# Patient Record
Sex: FEMALE | Race: WHITE | HISPANIC OR LATINO | Employment: FULL TIME | URBAN - METROPOLITAN AREA
[De-identification: names, ages, dates, MRNs, and addresses within clinical notes are randomized per-mention and may not be internally consistent; named-entity substitution may affect disease eponyms.]

---

## 2018-03-29 ENCOUNTER — OFFICE VISIT (OUTPATIENT)
Dept: MEDICAL GROUP | Facility: MEDICAL CENTER | Age: 38
End: 2018-03-29
Payer: COMMERCIAL

## 2018-03-29 VITALS
TEMPERATURE: 97.8 F | OXYGEN SATURATION: 98 % | BODY MASS INDEX: 29.94 KG/M2 | DIASTOLIC BLOOD PRESSURE: 72 MMHG | SYSTOLIC BLOOD PRESSURE: 118 MMHG | HEIGHT: 61 IN | WEIGHT: 158.6 LBS | HEART RATE: 66 BPM | RESPIRATION RATE: 16 BRPM

## 2018-03-29 DIAGNOSIS — K21.9 GASTROESOPHAGEAL REFLUX DISEASE WITHOUT ESOPHAGITIS: ICD-10-CM

## 2018-03-29 DIAGNOSIS — E55.9 VITAMIN D DEFICIENCY: ICD-10-CM

## 2018-03-29 DIAGNOSIS — Z83.3 FAMILY HISTORY OF DIABETES MELLITUS (DM): ICD-10-CM

## 2018-03-29 DIAGNOSIS — Z00.00 ANNUAL PHYSICAL EXAM: ICD-10-CM

## 2018-03-29 PROBLEM — Z91.09 ENVIRONMENTAL ALLERGIES: Status: ACTIVE | Noted: 2018-03-29

## 2018-03-29 PROBLEM — R10.13 EPIGASTRIC PAIN: Status: ACTIVE | Noted: 2018-03-29

## 2018-03-29 PROCEDURE — 99203 OFFICE O/P NEW LOW 30 MIN: CPT | Performed by: NURSE PRACTITIONER

## 2018-03-29 RX ORDER — RANITIDINE 150 MG/1
150 TABLET ORAL 2 TIMES DAILY PRN
Qty: 60 TAB | Refills: 11 | Status: SHIPPED | OUTPATIENT
Start: 2018-03-29 | End: 2019-03-06

## 2018-03-29 ASSESSMENT — PATIENT HEALTH QUESTIONNAIRE - PHQ9: CLINICAL INTERPRETATION OF PHQ2 SCORE: 0

## 2018-03-29 NOTE — PROGRESS NOTES
"Dorothy Diaz is a 37 y.o. female here to establish care and for epigastric pain ×1 year.    HPI:    Epigastric pain  Ongoing for the past year. Pain described as sharp and constant, lasts most of the day. Occurs 3-4 times per month. No associated nausea/vomiting, diarrhea, constipation. Does have acid indigestion with certain foods. Feels that the pain is worse when she is having indigestion. Worse with caffeine, greasy/fatty food, spicy food. Goes away on its own. Not worse at night. Has not taken any medication for this. She denies chest pain, shortness of breath, dizziness.    Current medicines (including changes today)  Current Outpatient Prescriptions   Medication Sig Dispense Refill   • raNITidine (ZANTAC) 150 MG Tab Take 1 Tab by mouth 2 times a day as needed for Heartburn. 60 Tab 11     No current facility-administered medications for this visit.      She  has a past medical history of Allergy.  She  has no past surgical history on file.  Social History   Substance Use Topics   • Smoking status: Never Smoker   • Smokeless tobacco: Never Used   • Alcohol use No     Social History     Social History Narrative   • No narrative on file     Family History   Problem Relation Age of Onset   • Diabetes Mother    • Diabetes Father      Family Status   Relation Status   • Mother    • Father          ROS  No chest pain, no abdominal pain, no rash.  Positive ROS as per HPI.  All other systems reviewed and are negative      Objective:     Blood pressure 118/72, pulse 66, temperature 36.6 °C (97.8 °F), resp. rate 16, height 1.549 m (5' 1\"), weight 71.9 kg (158 lb 9.6 oz), SpO2 98 %. Body mass index is 29.97 kg/m².     Physical Exam:    Constitutional: Alert, no distress.  Skin: Warm, dry, good turgor, no rashes in visible areas.  Eye: Equal, round and reactive, conjunctiva clear, lids normal.  ENMT: Lips without lesions, good dentition, oropharynx clear.  Neck: Trachea midline, no masses, no thyromegaly. No cervical or " supraclavicular lymphadenopathy.  Respiratory: Unlabored respiratory effort, lungs clear to auscultation, no wheezes, no ronchi.  Cardiovascular: Normal S1, S2, no murmur, no edema.  Abdomen: Soft, non-tender, no masses, no hepatosplenomegaly.  Psych: Alert and oriented x3, normal affect and mood.        Assessment and Plan:   The following treatment plan was discussed    1. Annual physical exam  Check labs, follow-up for annual.  - CBC WITH DIFFERENTIAL; Future  - COMP METABOLIC PANEL; Future  - LIPID PROFILE; Future  - TSH WITH REFLEX TO FT4; Future  - VITAMIN D,25 HYDROXY; Future    2. Gastroesophageal reflux disease without esophagitis  Unstable.  Advised patient to reduce fatty/greasy, spicy, acidic foods that increase symptoms.  Purchase Tums over-the-counter and use as needed for symptoms to see if this helps resolve.  If Tums are not effective, use ranitidine 150 mg as needed for heartburn symptoms.  - raNITidine (ZANTAC) 150 MG Tab; Take 1 Tab by mouth 2 times a day as needed for Heartburn.  Dispense: 60 Tab; Refill: 11    3. Vitamin D deficiency  Patient states she was previously very low.  Not currently taking a vitamin D supplement.  Recheck vitamin D level.  - VITAMIN D,25 HYDROXY; Future    4. Family history of diabetes mellitus (DM)  Mother and father are both diabetic.  Check A1c.  - HEMOGLOBIN A1C; Future      Followup: Return in about 4 weeks (around 4/26/2018) for Annual, GERD.    I have placed the below orders and discussed them with an approved delegating provider. The MA is performing the below orders under the direction of Dr. English

## 2018-03-29 NOTE — ASSESSMENT & PLAN NOTE
Ongoing for the past year. Pain described as sharp and constant, lasts most of the day. Occurs 3-4 times per month. No associated nausea/vomiting, diarrhea, constipation. Does have acid indigestion with certain foods. Feels that the pain is worse when she is having indigestion. Worse with caffeine, greasy/fatty food, spicy food. Goes away on its own. Not worse at night. Has not taken any medication for this. She denies chest pain, shortness of breath, dizziness.

## 2018-04-14 LAB
25(OH)D3+25(OH)D2 SERPL-MCNC: 17.8 NG/ML (ref 30–100)
ALBUMIN SERPL-MCNC: 4.3 G/DL (ref 3.5–5.5)
ALBUMIN/GLOB SERPL: 1.7 {RATIO} (ref 1.2–2.2)
ALP SERPL-CCNC: 72 IU/L (ref 39–117)
ALT SERPL-CCNC: 67 IU/L (ref 0–32)
AST SERPL-CCNC: 39 IU/L (ref 0–40)
BASOPHILS # BLD AUTO: 0 X10E3/UL (ref 0–0.2)
BASOPHILS NFR BLD AUTO: 0 %
BILIRUB SERPL-MCNC: 0.5 MG/DL (ref 0–1.2)
BUN SERPL-MCNC: 11 MG/DL (ref 6–20)
BUN/CREAT SERPL: 16 (ref 9–23)
CALCIUM SERPL-MCNC: 9.2 MG/DL (ref 8.7–10.2)
CHLORIDE SERPL-SCNC: 103 MMOL/L (ref 96–106)
CHOLEST SERPL-MCNC: 202 MG/DL (ref 100–199)
CO2 SERPL-SCNC: 21 MMOL/L (ref 18–29)
CREAT SERPL-MCNC: 0.68 MG/DL (ref 0.57–1)
EOSINOPHIL # BLD AUTO: 0.1 X10E3/UL (ref 0–0.4)
EOSINOPHIL NFR BLD AUTO: 2 %
ERYTHROCYTE [DISTWIDTH] IN BLOOD BY AUTOMATED COUNT: 13 % (ref 12.3–15.4)
GFR SERPLBLD CREATININE-BSD FMLA CKD-EPI: 113 ML/MIN/1.73
GFR SERPLBLD CREATININE-BSD FMLA CKD-EPI: 130 ML/MIN/1.73
GLOBULIN SER CALC-MCNC: 2.6 G/DL (ref 1.5–4.5)
GLUCOSE SERPL-MCNC: 117 MG/DL (ref 65–99)
HBA1C MFR BLD: 5.9 % (ref 4.8–5.6)
HCT VFR BLD AUTO: 40.1 % (ref 34–46.6)
HDLC SERPL-MCNC: 46 MG/DL
HGB BLD-MCNC: 13.6 G/DL (ref 11.1–15.9)
IMM GRANULOCYTES # BLD: 0 X10E3/UL (ref 0–0.1)
IMM GRANULOCYTES NFR BLD: 1 %
IMMATURE CELLS  115398: NORMAL
LABORATORY COMMENT REPORT: ABNORMAL
LDLC SERPL CALC-MCNC: 128 MG/DL (ref 0–99)
LYMPHOCYTES # BLD AUTO: 2.9 X10E3/UL (ref 0.7–3.1)
LYMPHOCYTES NFR BLD AUTO: 36 %
MCH RBC QN AUTO: 30.1 PG (ref 26.6–33)
MCHC RBC AUTO-ENTMCNC: 33.9 G/DL (ref 31.5–35.7)
MCV RBC AUTO: 89 FL (ref 79–97)
MONOCYTES # BLD AUTO: 0.7 X10E3/UL (ref 0.1–0.9)
MONOCYTES NFR BLD AUTO: 9 %
MORPHOLOGY BLD-IMP: NORMAL
NEUTROPHILS # BLD AUTO: 4.2 X10E3/UL (ref 1.4–7)
NEUTROPHILS NFR BLD AUTO: 52 %
NRBC BLD AUTO-RTO: NORMAL %
PLATELET # BLD AUTO: 325 X10E3/UL (ref 150–379)
POTASSIUM SERPL-SCNC: 4.1 MMOL/L (ref 3.5–5.2)
PROT SERPL-MCNC: 6.9 G/DL (ref 6–8.5)
RBC # BLD AUTO: 4.52 X10E6/UL (ref 3.77–5.28)
SODIUM SERPL-SCNC: 139 MMOL/L (ref 134–144)
TRIGL SERPL-MCNC: 139 MG/DL (ref 0–149)
TSH SERPL DL<=0.005 MIU/L-ACNC: 2.86 UIU/ML (ref 0.45–4.5)
VLDLC SERPL CALC-MCNC: 28 MG/DL (ref 5–40)
WBC # BLD AUTO: 8.1 X10E3/UL (ref 3.4–10.8)

## 2018-04-24 ENCOUNTER — OFFICE VISIT (OUTPATIENT)
Dept: URGENT CARE | Facility: PHYSICIAN GROUP | Age: 38
End: 2018-04-24
Payer: COMMERCIAL

## 2018-04-24 VITALS
OXYGEN SATURATION: 97 % | RESPIRATION RATE: 16 BRPM | BODY MASS INDEX: 30.96 KG/M2 | SYSTOLIC BLOOD PRESSURE: 118 MMHG | WEIGHT: 164 LBS | TEMPERATURE: 100.2 F | HEART RATE: 88 BPM | DIASTOLIC BLOOD PRESSURE: 70 MMHG | HEIGHT: 61 IN

## 2018-04-24 DIAGNOSIS — R11.2 NON-INTRACTABLE VOMITING WITH NAUSEA, UNSPECIFIED VOMITING TYPE: ICD-10-CM

## 2018-04-24 DIAGNOSIS — R19.7 DIARRHEA, UNSPECIFIED TYPE: ICD-10-CM

## 2018-04-24 DIAGNOSIS — R50.9 FEVER AND CHILLS: ICD-10-CM

## 2018-04-24 LAB
FLUAV+FLUBV AG SPEC QL IA: NEGATIVE
INT CON NEG: NEGATIVE
INT CON POS: POSITIVE

## 2018-04-24 PROCEDURE — 99203 OFFICE O/P NEW LOW 30 MIN: CPT | Performed by: FAMILY MEDICINE

## 2018-04-24 PROCEDURE — 87804 INFLUENZA ASSAY W/OPTIC: CPT | Performed by: FAMILY MEDICINE

## 2018-04-24 RX ORDER — ONDANSETRON 4 MG/1
4 TABLET, ORALLY DISINTEGRATING ORAL EVERY 8 HOURS PRN
Qty: 10 TAB | Refills: 0 | Status: SHIPPED | OUTPATIENT
Start: 2018-04-24 | End: 2018-04-26

## 2018-04-24 NOTE — LETTER
April 24, 2018         Patient: Dorothy Diaz   YOB: 1980   Date of Visit: 4/24/2018           To Whom it May Concern:    Dorothy Diaz was seen in my clinic on 4/24/2018. Please excuse 4/24 through 4/26/2018.       Sincerely,           Gordo Capellan M.D.  Electronically Signed

## 2018-04-26 ENCOUNTER — OFFICE VISIT (OUTPATIENT)
Dept: MEDICAL GROUP | Facility: MEDICAL CENTER | Age: 38
End: 2018-04-26
Payer: COMMERCIAL

## 2018-04-26 VITALS
HEART RATE: 73 BPM | OXYGEN SATURATION: 97 % | DIASTOLIC BLOOD PRESSURE: 60 MMHG | HEIGHT: 61 IN | WEIGHT: 164 LBS | TEMPERATURE: 97.5 F | RESPIRATION RATE: 16 BRPM | BODY MASS INDEX: 30.96 KG/M2 | SYSTOLIC BLOOD PRESSURE: 100 MMHG

## 2018-04-26 DIAGNOSIS — E66.9 OBESITY (BMI 30-39.9): ICD-10-CM

## 2018-04-26 DIAGNOSIS — R74.01 ELEVATED ALT MEASUREMENT: ICD-10-CM

## 2018-04-26 DIAGNOSIS — R73.03 PREDIABETES: ICD-10-CM

## 2018-04-26 DIAGNOSIS — K21.9 GASTROESOPHAGEAL REFLUX DISEASE WITHOUT ESOPHAGITIS: ICD-10-CM

## 2018-04-26 DIAGNOSIS — Z00.00 ANNUAL PHYSICAL EXAM: ICD-10-CM

## 2018-04-26 PROCEDURE — 99395 PREV VISIT EST AGE 18-39: CPT | Performed by: NURSE PRACTITIONER

## 2018-04-26 NOTE — ASSESSMENT & PLAN NOTE
Patient states all her her brother and parents have been diagnosed with fatty livers. She denies RUQ pain or other abdominal pain. She does eat moderate amounts of sugar, carbohydrates, and red meat. She also eats moderate amounts of cheese and dairy products.

## 2018-04-26 NOTE — ASSESSMENT & PLAN NOTE
Social/Family: Single, no children.   Work: Full time, supervisor.   Diet: moderate carbs/sugar. Vegetables, small portions, trying to increase. Chicken and beef.   Caffeine/Energy Drinks/Soda: Coffee 2 cups per day. No energy drinks. Soda once per week.   Exercise: Walking around neighborhood.   Stress: Normal life stress.   Sleep: No issues.   Depression/Anxiety Concerns: No concerns.

## 2018-04-26 NOTE — PROGRESS NOTES
"Subjective:   Dorothy Diaz is a 37 y.o. female here today for follow-up on labs, GERD, and in no physical:    Gastroesophageal reflux disease without esophagitis  Improved with dietary changes and exercise. Not taking ranitidine.    Annual physical exam  Social/Family: Single, no children.   Work: Full time, supervisor.   Diet: moderate carbs/sugar. Vegetables, small portions, trying to increase. Chicken and beef.   Caffeine/Energy Drinks/Soda: Coffee 2 cups per day. No energy drinks. Soda once per week.   Exercise: Walking around neighborhood.   Stress: Normal life stress.   Sleep: No issues.   Depression/Anxiety Concerns: No concerns.       Elevated ALT measurement  Patient states all her her brother and parents have been diagnosed with fatty livers. She denies RUQ pain or other abdominal pain. She does eat moderate amounts of sugar, carbohydrates, and red meat. She also eats moderate amounts of cheese and dairy products.         Current medicines (including changes today)  Current Outpatient Prescriptions   Medication Sig Dispense Refill   • raNITidine (ZANTAC) 150 MG Tab Take 1 Tab by mouth 2 times a day as needed for Heartburn. 60 Tab 11     No current facility-administered medications for this visit.      She  has a past medical history of Allergy.    ROS   No chest pain, no shortness of breath, no abdominal pain  Positive ROS as per HPI.  All other systems reviewed and are negative.     Objective:     Blood pressure 100/60, pulse 73, temperature 36.4 °C (97.5 °F), resp. rate 16, height 1.549 m (5' 0.98\"), weight 74.4 kg (164 lb), SpO2 97 %. Body mass index is 31 kg/m².     Physical Exam:  Constitutional: Alert, no distress.  Skin: Warm, dry, good turgor, no rashes in visible areas.  Eye: Equal, round and reactive, conjunctiva clear, lids normal.  ENMT: Lips without lesions, good dentition, oropharynx clear.  Neck: Trachea midline, no masses, no thyromegaly. No cervical or supraclavicular " lymphadenopathy  Respiratory: Unlabored respiratory effort, lungs clear to auscultation, no wheezes, no ronchi.  Cardiovascular: Normal S1, S2, no murmur, no edema.  Abdomen: Soft, non-tender, no masses, no hepatosplenomegaly.  Psych: Alert and oriented x3, normal affect and mood.      Assessment and Plan:   The following treatment plan was discussed    1. Annual physical exam  Patient and I discussed the importance of lifestyle changes, with particular emphasis on decreasing sugar and carbohydrate intake and increasing plant-based nutrition (for the purposes of weight loss, general health, and prevention of chronic illnesses), as well as regular cardiovascular exercise, proper sleep, and stress management. Patient verbalized understanding.      2. Prediabetes  Unstable.  Dietary and lifestyle changes discussed.  Repeat A1c in 3 months.  - HEMOGLOBIN A1C; Future    3. Elevated ALT measurement  Unstable.  Likely underlying fatty liver disease.  Recheck CMP in 3 months after dietary changes are implemented and weight loss is achieved.  If liver enzymes are still elevated, check liver ultrasound.  - COMP METABOLIC PANEL; Future    4. Gastroesophageal reflux disease without esophagitis  Stable.  Continue with GERD diet modifications.    5. Obesity (BMI 30-39.9)  Diet and exercise education provided.  - Patient identified as having weight management issue.  Appropriate orders and counseling given.      Followup: Return in about 3 months (around 7/26/2018) for prediabetes, liver enzymes, weight loss.    I have placed the below orders and discussed them with an approved delegating provider. The MA is performing the below orders under the direction of Dr. English

## 2018-04-30 ASSESSMENT — ENCOUNTER SYMPTOMS
EYE REDNESS: 0
WEIGHT LOSS: 0
EYE DISCHARGE: 0
HEADACHES: 1

## 2018-05-01 NOTE — PROGRESS NOTES
"Subjective:      Dorothy Diaz is a 37 y.o. female who presents with Emesis (Diarrhea, redness/rash on face,chills, fever x2days )            2 days multiple episodes N/V/D without blood in stool or emesis. +chills. No recent foreign travel, abx, camping. Intermittetn abd cramping. No focal abdominal pain. Face appears flushed. Normal urine output.  No other aggravating or alleviating factors.          Review of Systems   Constitutional: Positive for malaise/fatigue. Negative for weight loss.   Eyes: Negative for discharge and redness.   Cardiovascular: Negative for chest pain.   Musculoskeletal: Negative for joint pain.   Neurological: Positive for headaches.          Objective:     /70   Pulse 88   Temp 37.9 °C (100.2 °F)   Resp 16   Ht 1.549 m (5' 1\")   Wt 74.4 kg (164 lb)   LMP 03/24/2018   SpO2 97%   Breastfeeding? No   BMI 30.99 kg/m²      Physical Exam   Constitutional: She appears well-developed and well-nourished. No distress.   HENT:   Head: Normocephalic and atraumatic.   Eyes: Conjunctivae are normal.   Neck: Neck supple. No JVD present.   Cardiovascular: Normal rate, regular rhythm and normal heart sounds.    Pulmonary/Chest: Effort normal and breath sounds normal. She has no wheezes.   Abdominal: Soft. There is no tenderness.   Hyperactive bowel sounds.'     Neurological:   Speech is clear. Patient is appropriate and cooperative.     Skin: Skin is warm and dry. No rash noted.               Assessment/Plan:   Influenza negative    1. Fever and chills  POCT Influenza A/B   2. Non-intractable vomiting with nausea, unspecified vomiting type  DISCONTINUED: ondansetron (ZOFRAN ODT) 4 MG TABLET DISPERSIBLE   3. Diarrhea, unspecified type         Differential diagnosis, natural history, supportive care, and indications for immediate follow-up discussed at length.     Suspect viral AGE    Push fluids/pedialyte    Imodium prn    "

## 2018-07-21 LAB
ALBUMIN SERPL-MCNC: 4.4 G/DL (ref 3.5–5.5)
ALBUMIN/GLOB SERPL: 1.9 {RATIO} (ref 1.2–2.2)
ALP SERPL-CCNC: 83 IU/L (ref 39–117)
ALT SERPL-CCNC: 32 IU/L (ref 0–32)
AST SERPL-CCNC: 22 IU/L (ref 0–40)
BILIRUB SERPL-MCNC: 0.3 MG/DL (ref 0–1.2)
BUN SERPL-MCNC: 15 MG/DL (ref 6–20)
BUN/CREAT SERPL: 22 (ref 9–23)
CALCIUM SERPL-MCNC: 8.9 MG/DL (ref 8.7–10.2)
CHLORIDE SERPL-SCNC: 105 MMOL/L (ref 96–106)
CO2 SERPL-SCNC: 20 MMOL/L (ref 20–29)
CREAT SERPL-MCNC: 0.67 MG/DL (ref 0.57–1)
GLOBULIN SER CALC-MCNC: 2.3 G/DL (ref 1.5–4.5)
GLUCOSE SERPL-MCNC: 115 MG/DL (ref 65–99)
HBA1C MFR BLD: 5.6 % (ref 4.8–5.6)
IF AFRICAN AMERICAN  100797: 130 ML/MIN/1.73
IF NON AFRICAN AMER 100791: 113 ML/MIN/1.73
POTASSIUM SERPL-SCNC: 4.3 MMOL/L (ref 3.5–5.2)
PROT SERPL-MCNC: 6.7 G/DL (ref 6–8.5)
SODIUM SERPL-SCNC: 141 MMOL/L (ref 134–144)

## 2018-07-24 ENCOUNTER — TELEPHONE (OUTPATIENT)
Dept: MEDICAL GROUP | Facility: MEDICAL CENTER | Age: 38
End: 2018-07-24

## 2018-07-24 NOTE — TELEPHONE ENCOUNTER
----- Message from LYNDSAY Kolb sent at 7/23/2018  7:09 PM PDT -----  Will discuss lab results at future appointment.   LYNDSAY Kolb

## 2018-07-26 ENCOUNTER — OFFICE VISIT (OUTPATIENT)
Dept: MEDICAL GROUP | Facility: MEDICAL CENTER | Age: 38
End: 2018-07-26
Payer: COMMERCIAL

## 2018-07-26 VITALS
DIASTOLIC BLOOD PRESSURE: 62 MMHG | BODY MASS INDEX: 31.15 KG/M2 | HEART RATE: 54 BPM | WEIGHT: 165 LBS | SYSTOLIC BLOOD PRESSURE: 100 MMHG | RESPIRATION RATE: 16 BRPM | OXYGEN SATURATION: 100 % | HEIGHT: 61 IN | TEMPERATURE: 98.1 F

## 2018-07-26 DIAGNOSIS — Z30.011 ENCOUNTER FOR INITIAL PRESCRIPTION OF CONTRACEPTIVE PILLS: ICD-10-CM

## 2018-07-26 DIAGNOSIS — R74.01 ELEVATED ALT MEASUREMENT: ICD-10-CM

## 2018-07-26 DIAGNOSIS — R73.03 PREDIABETES: ICD-10-CM

## 2018-07-26 PROCEDURE — 99214 OFFICE O/P EST MOD 30 MIN: CPT | Performed by: NURSE PRACTITIONER

## 2018-07-26 RX ORDER — LEVONORGESTREL AND ETHINYL ESTRADIOL 0.1-0.02MG
1 KIT ORAL DAILY
Qty: 28 TAB | Refills: 11 | Status: SHIPPED | OUTPATIENT
Start: 2018-07-26 | End: 2019-03-06

## 2018-07-26 NOTE — PROGRESS NOTES
"Subjective:   Dorothy Diaz is a 38 y.o. female here today for follow-up on labs:    Prediabetes  A1C improved from 5.9 to 5.6. Has made significant dietary changes.     Elevated ALT measurement  Normalized since 3 months ago.          Current medicines (including changes today)  Current Outpatient Prescriptions   Medication Sig Dispense Refill   • levonorgestrel-ethinyl estradiol (AVIANE, ALESSE, LESSINA) 0.1-20 MG-MCG per tablet Take 1 Tab by mouth every day. 28 Tab 11   • raNITidine (ZANTAC) 150 MG Tab Take 1 Tab by mouth 2 times a day as needed for Heartburn. 60 Tab 11     No current facility-administered medications for this visit.      She  has a past medical history of Allergy.    ROS   No chest pain, no shortness of breath, no abdominal pain  Positive ROS as per HPI.  All other systems reviewed and are negative.     Objective:     Blood pressure 100/62, pulse (!) 54, temperature 36.7 °C (98.1 °F), resp. rate 16, height 1.549 m (5' 0.98\"), weight 74.8 kg (165 lb), SpO2 100 %. Body mass index is 31.19 kg/m².     Physical Exam:  Constitutional: Alert, no distress.  Skin: Warm, dry, good turgor, no rashes in visible areas.  Eye: Equal, round, conjunctiva clear, lids normal.  ENMT: Lips without lesions, good dentition  Neck: Trachea midline  Respiratory: Unlabored respiratory effort  Cardiovascular: No edema.  Psych: Alert and oriented x3, normal affect and mood.      Assessment and Plan:   The following treatment plan was discussed    1. Prediabetes  Stable, resolved.  Continue with dietary changes discussed, repeat A1c annually.    2. Elevated ALT measurement  Stable, resolved.  Continue with dietary changes as discussed.  Repeat  Liver function annually.    3. Encounter for initial prescription of contraceptive pills  Begin OCP thereafter menses ends.  Patient to notify me of any side effects from medication.  We discussed that birth control can increase risk of blood clots, liver tumors, gallbladder disease, " and stroke. Side effects can include headache, constipation, nausea, mood swings. They do not protect against STDs, therefore condoms should be worn with new partners. They are not effective if not taken as prescribed, if a dose is missed or delayed a backup method should be used. Antibiotics can make birth control less effective, if an antibiotic is taken a backup method should be used. Any unusual headache, leg pain/swelling, chest pain, shortness of breath, difficulty speaking or moving should be addressed immediately in the ER. Patient verbalizes understanding.     - levonorgestrel-ethinyl estradiol (AVIANE, ALESSE, LESSINA) 0.1-20 MG-MCG per tablet; Take 1 Tab by mouth every day.  Dispense: 28 Tab; Refill: 11      Followup: Return in about 1 year (around 7/26/2019), or if symptoms worsen or fail to improve.    I have placed the below orders and discussed them with an approved delegating provider. The MA is performing the below orders under the direction of Dr. English

## 2019-02-17 ENCOUNTER — OFFICE VISIT (OUTPATIENT)
Dept: URGENT CARE | Facility: PHYSICIAN GROUP | Age: 39
End: 2019-02-17
Payer: COMMERCIAL

## 2019-02-17 VITALS
WEIGHT: 145 LBS | BODY MASS INDEX: 27.38 KG/M2 | HEIGHT: 61 IN | RESPIRATION RATE: 16 BRPM | HEART RATE: 86 BPM | TEMPERATURE: 98 F | OXYGEN SATURATION: 94 % | DIASTOLIC BLOOD PRESSURE: 64 MMHG | SYSTOLIC BLOOD PRESSURE: 116 MMHG

## 2019-02-17 DIAGNOSIS — J98.8 RTI (RESPIRATORY TRACT INFECTION): ICD-10-CM

## 2019-02-17 PROCEDURE — 99214 OFFICE O/P EST MOD 30 MIN: CPT | Performed by: PHYSICIAN ASSISTANT

## 2019-02-17 RX ORDER — FLUTICASONE PROPIONATE 50 MCG
1 SPRAY, SUSPENSION (ML) NASAL DAILY
Qty: 16 G | Refills: 0 | Status: SHIPPED | OUTPATIENT
Start: 2019-02-17 | End: 2019-03-06

## 2019-02-17 RX ORDER — DOXYCYCLINE HYCLATE 100 MG
100 TABLET ORAL 2 TIMES DAILY
Qty: 20 TAB | Refills: 0 | Status: SHIPPED | OUTPATIENT
Start: 2019-02-17 | End: 2019-02-27

## 2019-02-17 ASSESSMENT — COPD QUESTIONNAIRES: COPD: 0

## 2019-02-17 ASSESSMENT — ENCOUNTER SYMPTOMS
SHORTNESS OF BREATH: 0
MYALGIAS: 1
COUGH: 1
FEVER: 1

## 2019-02-17 NOTE — PROGRESS NOTES
"Subjective:   Dorothy Diaz is a 38 y.o. female who presents for Cough (x 5 days )        Cough   This is a new problem. The current episode started in the past 7 days. The problem has been gradually worsening. The cough is productive of sputum. Associated symptoms include a fever (subjective), myalgias, nasal congestion and postnasal drip. Pertinent negatives include no shortness of breath. Treatments tried: nyquil, dayquil, zyrtec. The treatment provided mild relief. Her past medical history is significant for environmental allergies. There is no history of asthma, bronchitis, COPD, emphysema or pneumonia.     Review of Systems   Constitutional: Positive for fever (subjective).   HENT: Positive for postnasal drip.    Respiratory: Positive for cough. Negative for shortness of breath.    Musculoskeletal: Positive for myalgias.   Endo/Heme/Allergies: Positive for environmental allergies.       PMH:  has a past medical history of Allergy.  MEDS:   Current Outpatient Prescriptions:   •  doxycycline (VIBRAMYCIN) 100 MG Tab, Take 1 Tab by mouth 2 times a day for 10 days., Disp: 20 Tab, Rfl: 0  •  fluticasone (FLONASE) 50 MCG/ACT nasal spray, Spray 1 Spray in nose every day., Disp: 16 g, Rfl: 0  •  levonorgestrel-ethinyl estradiol (AVIANE, ALESSE, LESSINA) 0.1-20 MG-MCG per tablet, Take 1 Tab by mouth every day., Disp: 28 Tab, Rfl: 11  •  raNITidine (ZANTAC) 150 MG Tab, Take 1 Tab by mouth 2 times a day as needed for Heartburn., Disp: 60 Tab, Rfl: 11  ALLERGIES: No Known Allergies  SURGHX: No past surgical history on file.  SOCHX:  reports that she has never smoked. She has never used smokeless tobacco. She reports that she does not drink alcohol or use drugs.  FH: Family history was reviewed, no pertinent findings to report   Objective:   /64   Pulse 86   Temp 36.7 °C (98 °F) (Temporal)   Resp 16   Ht 1.549 m (5' 1\")   Wt 65.8 kg (145 lb)   SpO2 94%   BMI 27.40 kg/m²   Physical Exam   Constitutional: She " appears well-developed and well-nourished.   HENT:   Head: Normocephalic and atraumatic.   Right Ear: Tympanic membrane, external ear and ear canal normal.   Left Ear: Tympanic membrane, external ear and ear canal normal.   Nose: Rhinorrhea present. Right sinus exhibits frontal sinus tenderness. Left sinus exhibits frontal sinus tenderness.   Mouth/Throat: Uvula is midline, oropharynx is clear and moist and mucous membranes are normal.   Neck: Neck supple.   Cardiovascular: Normal rate, regular rhythm and normal heart sounds.  Exam reveals no gallop and no friction rub.    No murmur heard.  Pulmonary/Chest: Effort normal. No respiratory distress. She has no decreased breath sounds. She has no wheezes. She has rhonchi (with cough). She has no rales.   Neurological: She is alert.   Skin: Skin is warm and dry.   Psychiatric: She has a normal mood and affect. Her behavior is normal. Thought content normal.   Vitals reviewed.        Assessment/Plan:   1. RTI (respiratory tract infection)  - doxycycline (VIBRAMYCIN) 100 MG Tab; Take 1 Tab by mouth 2 times a day for 10 days.  Dispense: 20 Tab; Refill: 0  - fluticasone (FLONASE) 50 MCG/ACT nasal spray; Spray 1 Spray in nose every day.  Dispense: 16 g; Refill: 0    Physical exam consistent with upper respiratory infection and developing bronchitis.  Oxygen saturation is within normal limits but slightly low for patient at 94%.  Clinical suspicion for pneumonia is low.  I will treat patient with doxycycline.  Pt instructed to complete full course of medication despite symptomatic improvement. Pt to take med with meals to alleviate GI upset. Pt to take a probiotic or eat Annel’s yogurt/ kefir while taking the medication.    Flonase 1 squirt in each nostril, as instructed in clinic, once a day.  Use nasal saline TID to promote drainage.   Salt water gurgles to soothe sore throat.  Ayr saline gel to moisturize nasal passage and prevent bleeding.  Try to use sudafed sparingly in  order to allow sinuses to drain.  Avoid the longer formulations and try to take only in the morning and/or at noon if needed.  If you fail to improve in 3-5 days or symptoms worsen/new symptoms develop, RTC for reevaluation    Patient given work note to excuse her from work today and tomorrow.  Patient should rest and increase fluid intake.    Differential diagnosis, natural history, supportive care, and indications for immediate follow-up discussed.

## 2019-02-17 NOTE — LETTER
[facility] [place]  [address]  [place] [id number]     February 17, 2019    Patient: Dorothy Diaz   YOB: 1980   Date of Visit: 2/17/2019       To Whom It May Concern:    Dorothy Diaz was seen and treated in our department on 2/17/2019. Please excuse her from work from 2/17-1/18.    Sincerely,     Piyush Velez

## 2019-03-06 ENCOUNTER — HOSPITAL ENCOUNTER (OUTPATIENT)
Dept: RADIOLOGY | Facility: MEDICAL CENTER | Age: 39
End: 2019-03-06
Attending: FAMILY MEDICINE
Payer: COMMERCIAL

## 2019-03-06 ENCOUNTER — OFFICE VISIT (OUTPATIENT)
Dept: MEDICAL GROUP | Age: 39
End: 2019-03-06
Payer: COMMERCIAL

## 2019-03-06 VITALS
BODY MASS INDEX: 28.13 KG/M2 | HEIGHT: 61 IN | WEIGHT: 149 LBS | DIASTOLIC BLOOD PRESSURE: 72 MMHG | OXYGEN SATURATION: 96 % | TEMPERATURE: 98.4 F | SYSTOLIC BLOOD PRESSURE: 124 MMHG | HEART RATE: 70 BPM

## 2019-03-06 DIAGNOSIS — J40 BRONCHITIS: ICD-10-CM

## 2019-03-06 PROCEDURE — 71046 X-RAY EXAM CHEST 2 VIEWS: CPT

## 2019-03-06 PROCEDURE — 99214 OFFICE O/P EST MOD 30 MIN: CPT | Performed by: FAMILY MEDICINE

## 2019-03-06 RX ORDER — ASCORBIC ACID 500 MG
500 TABLET ORAL 4 TIMES DAILY
Qty: 90 TAB | Refills: 0 | Status: SHIPPED | OUTPATIENT
Start: 2019-03-06 | End: 2020-03-30

## 2019-03-06 RX ORDER — PREDNISONE 20 MG/1
TABLET ORAL
Qty: 12 TAB | Refills: 0 | Status: SHIPPED | OUTPATIENT
Start: 2019-03-06 | End: 2020-03-30

## 2019-03-06 RX ORDER — DOXYCYCLINE HYCLATE 100 MG
100 TABLET ORAL 2 TIMES DAILY
Qty: 20 TAB | Refills: 0 | Status: SHIPPED | OUTPATIENT
Start: 2019-03-06 | End: 2020-03-30

## 2019-03-06 ASSESSMENT — PATIENT HEALTH QUESTIONNAIRE - PHQ9: CLINICAL INTERPRETATION OF PHQ2 SCORE: 0

## 2019-03-06 NOTE — ASSESSMENT & PLAN NOTE
The patient is a 38-year-old female who presents clinic with chief complaint of generalized malaise cough productive of greenish sputum, sore throat since roughly 3 weeks ago.  He was seen in urgent care on 17 February 2019 she was given doxycycline and Flonase her symptoms improved for the first 2 days but have returned since.  He is unable to sleep difficulty with appetite, no fevers no chills no nausea no vomiting she is able to tolerate p.o. fluids denies any headaches no new rashes no joint pain no changes in vision.

## 2019-03-06 NOTE — PROGRESS NOTES
This medical record contains text that has been entered with the assistance of computer voice recognition and dictation software.  Therefore, it may contain unintended errors in text, spelling, punctuation, or grammar    Chief Complaint   Patient presents with   • Cough     x3 weeks   • Fever     x3 weeks   • Headache     [id number]   • Blurred Vision     [id number]         Dorothy Diaz is a 38 y.o. female here evaluation and management of: cough      HPI:     Bronchitis  The patient is a 38-year-old female who presents clinic with chief complaint of generalized malaise cough productive of greenish sputum, sore throat since roughly 3 weeks ago.  He was seen in urgent care on 17 February 2019 she was given doxycycline and Flonase her symptoms improved for the first 2 days but have returned since.  He is unable to sleep difficulty with appetite, no fevers no chills no nausea no vomiting she is able to tolerate p.o. fluids denies any headaches no new rashes no joint pain no changes in vision.    Current medicines (including changes today)  Current Outpatient Prescriptions   Medication Sig Dispense Refill   • doxycycline (VIBRAMYCIN) 100 MG Tab Take 1 Tab by mouth 2 times a day. 20 Tab 0   • predniSONE (DELTASONE) 20 MG Tab Take 3 tabs po for 2 days then 2 tabs for 2 days then 1 for 2 days 12 Tab 0   • ascorbic acid (VITAMIN C) 500 MG tablet Take 1 Tab by mouth 4 times a day. 90 Tab 0     No current facility-administered medications for this visit.      She  has a past medical history of Allergy.  She  has no past surgical history on file.  Social History   Substance Use Topics   • Smoking status: Never Smoker   • Smokeless tobacco: Never Used   • Alcohol use No     Social History     Social History Narrative   • No narrative on file     Family History   Problem Relation Age of Onset   • Diabetes Mother    • Diabetes Father      Family Status   Relation Status   • Mo (Not Specified)   • Fa (Not Specified)  "        ROS    Please see hpi     All other systems reviewed and are negative     Objective:     Blood pressure 124/72, pulse 70, temperature 36.9 °C (98.4 °F), height 1.549 m (5' 1\"), weight 67.6 kg (149 lb), last menstrual period 02/12/2019, SpO2 96 %, not currently breastfeeding. Body mass index is 28.15 kg/m².  Physical Exam:    Constitutional: Alert, no distress.  Skin: Warm, dry, good turgor, no rashes in visible areas  Eye: Equal, round and reactive, conjunctiva clear, lids normal.  ENMT: Lips without lesions, good dentition, oropharynx clear.  Neck: Trachea midline, no masses, no thyromegaly. No cervical or supraclavicular lymphadenopathy.  Respiratory: Unlabored respiratory effort, lungs clear to auscultation, no wheezes, no ronchi.  Cardiovascular: Normal S1, S2, no murmur, no edema  Abdomen: Soft, non-tender, no masses, no hepatosplenomegaly.  Psych: Alert and oriented x3, normal affect and mood.          Assessment and Plan:   The following treatment plan was discussed      1. Bronchitis    I do not want to change the antibiotic regimen in effort to prevent resistance  We will repeat a course of doxycycline in addition to oral steroids      - doxycycline (VIBRAMYCIN) 100 MG Tab; Take 1 Tab by mouth 2 times a day.  Dispense: 20 Tab; Refill: 0  - predniSONE (DELTASONE) 20 MG Tab; Take 3 tabs po for 2 days then 2 tabs for 2 days then 1 for 2 days  Dispense: 12 Tab; Refill: 0  - DX-CHEST-2 VIEWS; Future  - ascorbic acid (VITAMIN C) 500 MG tablet; Take 1 Tab by mouth 4 times a day.  Dispense: 90 Tab; Refill: 0            Instructed to Follow up in clinic or ER for worsening symptoms, difficulty breathing, lack of expected recovery, or should new symptoms or problems arise.    Followup: Return in about 3 months (around 6/6/2019) for With PCP.       Once again this medical record contains text that has been entered with the assistance of computer voice recognition and dictation software.  Therefore, it may " contain unintended errors in text, spelling, punctuation, or grammar

## 2019-03-06 NOTE — LETTER
March 6, 2019         Patient: Dorothy Diaz   YOB: 1980   Date of Visit: 3/6/2019           To Whom it May Concern:    Dorothy Diaz was seen in my clinic on 3/6/2019. She may return to work on 3/11/2019    If you have any questions or concerns, please don't hesitate to call.        Sincerely,           Luis Mtz M.D.  Electronically Signed

## 2020-03-30 ENCOUNTER — OFFICE VISIT (OUTPATIENT)
Dept: MEDICAL GROUP | Facility: MEDICAL CENTER | Age: 40
End: 2020-03-30
Payer: COMMERCIAL

## 2020-03-30 VITALS
OXYGEN SATURATION: 97 % | WEIGHT: 163 LBS | SYSTOLIC BLOOD PRESSURE: 118 MMHG | TEMPERATURE: 97.5 F | HEIGHT: 61 IN | BODY MASS INDEX: 30.78 KG/M2 | DIASTOLIC BLOOD PRESSURE: 70 MMHG | HEART RATE: 66 BPM

## 2020-03-30 DIAGNOSIS — E66.9 OBESITY (BMI 30-39.9): ICD-10-CM

## 2020-03-30 DIAGNOSIS — R73.03 PREDIABETES: ICD-10-CM

## 2020-03-30 DIAGNOSIS — E55.9 VITAMIN D DEFICIENCY: ICD-10-CM

## 2020-03-30 DIAGNOSIS — E78.00 PURE HYPERCHOLESTEROLEMIA: ICD-10-CM

## 2020-03-30 DIAGNOSIS — Z23 NEED FOR VACCINATION: ICD-10-CM

## 2020-03-30 DIAGNOSIS — Z00.00 ANNUAL PHYSICAL EXAM: ICD-10-CM

## 2020-03-30 DIAGNOSIS — K21.9 GASTROESOPHAGEAL REFLUX DISEASE WITHOUT ESOPHAGITIS: ICD-10-CM

## 2020-03-30 PROCEDURE — 90471 IMMUNIZATION ADMIN: CPT | Performed by: NURSE PRACTITIONER

## 2020-03-30 PROCEDURE — 90686 IIV4 VACC NO PRSV 0.5 ML IM: CPT | Performed by: NURSE PRACTITIONER

## 2020-03-30 PROCEDURE — 90472 IMMUNIZATION ADMIN EACH ADD: CPT | Performed by: NURSE PRACTITIONER

## 2020-03-30 PROCEDURE — 99395 PREV VISIT EST AGE 18-39: CPT | Mod: 25 | Performed by: NURSE PRACTITIONER

## 2020-03-30 PROCEDURE — 90715 TDAP VACCINE 7 YRS/> IM: CPT | Performed by: NURSE PRACTITIONER

## 2020-03-30 RX ORDER — OMEPRAZOLE 20 MG/1
20 CAPSULE, DELAYED RELEASE ORAL DAILY
Qty: 30 CAP | Refills: 6 | Status: SHIPPED | OUTPATIENT
Start: 2020-03-30 | End: 2020-05-11

## 2020-03-30 ASSESSMENT — PATIENT HEALTH QUESTIONNAIRE - PHQ9: CLINICAL INTERPRETATION OF PHQ2 SCORE: 0

## 2020-03-30 ASSESSMENT — FIBROSIS 4 INDEX: FIB4 SCORE: 0.45

## 2020-03-30 NOTE — PATIENT INSTRUCTIONS
Food Choices for Gastroesophageal Reflux Disease, Adult  When you have gastroesophageal reflux disease (GERD), the foods you eat and your eating habits are very important. Choosing the right foods can help ease the discomfort of GERD.  WHAT GENERAL GUIDELINES DO I NEED TO FOLLOW?  · Choose fruits, vegetables, whole grains, low-fat dairy products, and low-fat meat, fish, and poultry.  · Limit fats such as oils, salad dressings, butter, nuts, and avocado.  · Keep a food diary to identify foods that cause symptoms.  · Avoid foods that cause reflux. These may be different for different people.  · Eat frequent small meals instead of three large meals each day.  · Eat your meals slowly, in a relaxed setting.  · Limit fried foods.  · Cook foods using methods other than frying.  · Avoid drinking alcohol.  · Avoid drinking large amounts of liquids with your meals.  · Avoid bending over or lying down until 2-3 hours after eating.  WHAT FOODS ARE NOT RECOMMENDED?  The following are some foods and drinks that may worsen your symptoms:  Vegetables  Tomatoes. Tomato juice. Tomato and spaghetti sauce. Chili peppers. Onion and garlic. Horseradish.  Fruits  Oranges, grapefruit, and lemon (fruit and juice).  Meats  High-fat meats, fish, and poultry. This includes hot dogs, ribs, ham, sausage, salami, and pedraza.  Dairy  Whole milk and chocolate milk. Sour cream. Cream. Butter. Ice cream. Cream cheese.   Beverages  Coffee and tea, with or without caffeine. Carbonated beverages or energy drinks.  Condiments  Hot sauce. Barbecue sauce.   Sweets/Desserts  Chocolate and cocoa. Donuts. Peppermint and spearmint.  Fats and Oils  High-fat foods, including French fries and potato chips.  Other  Vinegar. Strong spices, such as black pepper, white pepper, red pepper, cayenne, rossi powder, cloves, ginger, and chili powder.  The items listed above may not be a complete list of foods and beverages to avoid. Contact your dietitian for more  information.     This information is not intended to replace advice given to you by your health care provider. Make sure you discuss any questions you have with your health care provider.     Document Released: 12/18/2006 Document Revised: 01/08/2016 Document Reviewed: 10/22/2014  [employer] Interactive Patient Education ©2016 [employer]      Low-Fat Diet for Pancreatitis or Gallbladder Conditions  A low-fat diet can be helpful if you have pancreatitis or a gallbladder condition. With these conditions, your pancreas and gallbladder have trouble digesting fats. A healthy eating plan with less fat will help rest your pancreas and gallbladder and reduce your symptoms.  What do I need to know about this diet?  · Eat a low-fat diet.  ¨ Reduce your fat intake to less than 20-30% of your total daily calories. This is less than 50-60 g of fat per day.  ¨ Remember that you need some fat in your diet. Ask your dietician what your daily goal should be.  ¨ Choose nonfat and low-fat healthy foods. Look for the words “nonfat,” “low fat,” or “fat free.”  ¨ As a guide, look on the label and choose foods with less than 3 g of fat per serving. Eat only one serving.  · Avoid alcohol.  · Do not smoke. If you need help quitting, talk with your health care provider.  · Eat small frequent meals instead of three large heavy meals.  What foods can I eat?  Grains   Include healthy grains and starches such as potatoes, wheat bread, fiber-rich cereal, and brown rice. Choose whole grain options whenever possible. In adults, whole grains should account for 45-65% of your daily calories.  Fruits and Vegetables   Eat plenty of fruits and vegetables. Fresh fruits and vegetables add fiber to your diet.  Meats and Other Protein Sources   Eat lean meat such as chicken and pork. Trim any fat off of meat before cooking it. Eggs, fish, and beans are other sources of protein. In adults, these foods should account for 10-35% of your daily calories.  Dairy    Choose low-fat milk and dairy options. Dairy includes fat and protein, as well as calcium.  Fats and Oils   Limit high-fat foods such as fried foods, sweets, baked goods, sugary drinks.  Other   Creamy sauces and condiments, such as mayonnaise, can add extra fat. Think about whether or not you need to use them, or use smaller amounts or low fat options.  What foods are not recommended?  · High fat foods, such as:  ¨ Baked goods.  ¨ Ice cream.  ¨ Ivorian toast.  ¨ Sweet rolls.  ¨ Pizza.  ¨ Cheese bread.  ¨ Foods covered with batter, butter, creamy sauces, or cheese.  ¨ Fried foods.  ¨ Sugary drinks and desserts.  · Foods that cause gas or bloating  This information is not intended to replace advice given to you by your health care provider. Make sure you discuss any questions you have with your health care provider.  Document Released: 12/23/2014 Document Revised: 05/25/2017 Document Reviewed: 12/01/2014  [employer] Interactive Patient Education © 2017 [employer]

## 2020-03-30 NOTE — ASSESSMENT & PLAN NOTE
Social/Family: Single, no children.   Work: Full time, supervisor.   Diet: Cut back on carbs/sugar, plans to cut back more. Vegetables daily, small portions, trying to increase. Chicken and beef. Occ fish  Caffeine/Energy Drinks/Soda: Coffee 1-2 cups per day, 2 packets sugar. No energy drinks. Soda once per week or less.    Exercise: Walking around neighborhood. 10-15 treadmill strolling walk  Stress: Normal life stress.   Sleep: No issues.   Depression/Anxiety Concerns: No concerns.     Last Pap with GYN: Feb 2018, due 2021

## 2020-03-30 NOTE — PROGRESS NOTES
"Subjective:   Dorothy Diaz is a 39 y.o. female here today for annual and stomach pain    Annual physical exam  Social/Family: Single, no children.   Work: Full time, supervisor.   Diet: Cut back on carbs/sugar, plans to cut back more. Vegetables daily, small portions, trying to increase. Chicken and beef. Occ fish  Caffeine/Energy Drinks/Soda: Coffee 1-2 cups per day, 2 packets sugar. No energy drinks. Soda once per week or less.    Exercise: Walking around neighborhood. 10-15 treadmill strolling walk  Stress: Normal life stress.   Sleep: No issues.   Depression/Anxiety Concerns: No concerns.     Last Pap with GYN: Feb 2018, due 2021      Gastroesophageal reflux disease without esophagitis  Chronic. Worsened more recently. Having significant pain either half way through eating a meal or immediately afterward.    Pain is epigastric, slightly to the right of abdomen. No nausea/vomiting.     Has not tried any OTC remedies for this. Has significant pain after eating eggs, has been avoiding aggravating foods.        Current medicines (including changes today)  Current Outpatient Medications   Medication Sig Dispense Refill   • Vitamin D 12.5 MCG/0.25ML Liquid Take  by mouth.     • omeprazole (PRILOSEC) 20 MG delayed-release capsule Take 1 Cap by mouth every day. 30 Cap 6     No current facility-administered medications for this visit.      She  has a past medical history of Allergy, Elevated ALT measurement (4/26/2018), Gastroesophageal reflux disease without esophagitis (3/29/2018), Prediabetes (4/26/2018), and Vitamin D deficiency (3/29/2018).    ROS   No chest pain, no shortness of breath, no abdominal pain  Positive ROS as per HPI.  All other systems reviewed and are negative.     Objective:     /70 (BP Location: Right arm, Patient Position: Sitting, BP Cuff Size: Adult)   Pulse 66   Temp 36.4 °C (97.5 °F) (Temporal)   Ht 1.549 m (5' 1\")   Wt 73.9 kg (163 lb)   SpO2 97%  Body mass index is 30.8 kg/m². "     Physical Exam:  Constitutional: Alert, no distress.  Skin: Warm, dry, good turgor, no rashes in visible areas.  Eye: Equal, round and reactive, conjunctiva clear, lids normal.  ENMT: Lips without lesions, good dentition, oropharynx clear.  Neck: Trachea midline, no masses, no thyromegaly. No cervical or supraclavicular lymphadenopathy  Respiratory: Unlabored respiratory effort, lungs clear to auscultation, no wheezes, no ronchi.  Cardiovascular: Normal S1, S2, no murmur, no edema.  Abdomen: Soft, non-tender, no masses, no hepatosplenomegaly.  Psych: Alert and oriented x3, normal affect and mood.      Assessment and Plan:   The following treatment plan was discussed    1. Annual physical exam  Patient and I discussed the importance of lifestyle changes, with particular emphasis on decreasing sugar and carbohydrate intake and increasing plant-based nutrition (for the purposes of weight loss, general health, and prevention of chronic illnesses), as well as regular cardiovascular exercise, proper sleep, and stress management. Patient verbalized understanding.    - CBC WITH DIFFERENTIAL; Future  - Comp Metabolic Panel; Future  - HEMOGLOBIN A1C; Future  - Lipid Profile; Future  - TSH WITH REFLEX TO FT4; Future  - VITAMIN D,25 HYDROXY; Future    2. Gastroesophageal reflux disease without esophagitis  Unstable  GERD vs gallbladder  Trial of omeprazole daily and TUMS as needed  If symptoms not improved, or worsened, after 4 weeks, notify me and we will consider US gallbladder  - omeprazole (PRILOSEC) 20 MG delayed-release capsule; Take 1 Cap by mouth every day.  Dispense: 30 Cap; Refill: 6    3. Vitamin D deficiency  - VITAMIN D,25 HYDROXY; Future    4. Prediabetes  - HEMOGLOBIN A1C; Future    5. Pure hypercholesterolemia  - Lipid Profile; Future    6. Need for vaccination  - Influenza Vaccine Quad Injection (PF)  - Tdap =>8yo IM      Followup: Return in about 1 year (around 3/30/2021), or if symptoms worsen or fail to  improve.    I have placed the below orders and discussed them with an approved delegating provider. The MA is performing the below orders under the direction of Dr. English

## 2020-03-30 NOTE — ASSESSMENT & PLAN NOTE
Chronic. Worsened more recently. Having significant pain either half way through eating a meal or immediately afterward.    Pain is epigastric, slightly to the right of abdomen. No nausea/vomiting.     Has not tried any OTC remedies for this. Has significant pain after eating eggs, has been avoiding aggravating foods.

## 2020-05-07 LAB
25(OH)D3+25(OH)D2 SERPL-MCNC: 14.9 NG/ML (ref 30–100)
ALBUMIN SERPL-MCNC: 4.6 G/DL (ref 3.8–4.8)
ALBUMIN/GLOB SERPL: 2.2 {RATIO} (ref 1.2–2.2)
ALP SERPL-CCNC: 76 IU/L (ref 39–117)
ALT SERPL-CCNC: 69 IU/L (ref 0–32)
AST SERPL-CCNC: 26 IU/L (ref 0–40)
BASOPHILS # BLD AUTO: 0.1 X10E3/UL (ref 0–0.2)
BASOPHILS NFR BLD AUTO: 1 %
BILIRUB SERPL-MCNC: 0.4 MG/DL (ref 0–1.2)
BUN SERPL-MCNC: 13 MG/DL (ref 6–20)
BUN/CREAT SERPL: 19 (ref 9–23)
CALCIUM SERPL-MCNC: 9.1 MG/DL (ref 8.7–10.2)
CHLORIDE SERPL-SCNC: 107 MMOL/L (ref 96–106)
CHOLEST SERPL-MCNC: 206 MG/DL (ref 100–199)
CO2 SERPL-SCNC: 17 MMOL/L (ref 20–29)
CREAT SERPL-MCNC: 0.69 MG/DL (ref 0.57–1)
EOSINOPHIL # BLD AUTO: 0.2 X10E3/UL (ref 0–0.4)
EOSINOPHIL NFR BLD AUTO: 2 %
ERYTHROCYTE [DISTWIDTH] IN BLOOD BY AUTOMATED COUNT: 12.5 % (ref 11.7–15.4)
GLOBULIN SER CALC-MCNC: 2.1 G/DL (ref 1.5–4.5)
GLUCOSE SERPL-MCNC: 127 MG/DL (ref 65–99)
HBA1C MFR BLD: 6.1 % (ref 4.8–5.6)
HCT VFR BLD AUTO: 38.9 % (ref 34–46.6)
HDLC SERPL-MCNC: 48 MG/DL
HGB BLD-MCNC: 13.2 G/DL (ref 11.1–15.9)
IMM GRANULOCYTES # BLD AUTO: 0.1 X10E3/UL (ref 0–0.1)
IMM GRANULOCYTES NFR BLD AUTO: 1 %
IMMATURE CELLS  115398: NORMAL
LABORATORY COMMENT REPORT: ABNORMAL
LDLC SERPL CALC-MCNC: 124 MG/DL (ref 0–99)
LYMPHOCYTES # BLD AUTO: 2.9 X10E3/UL (ref 0.7–3.1)
LYMPHOCYTES NFR BLD AUTO: 32 %
MCH RBC QN AUTO: 30.1 PG (ref 26.6–33)
MCHC RBC AUTO-ENTMCNC: 33.9 G/DL (ref 31.5–35.7)
MCV RBC AUTO: 89 FL (ref 79–97)
MONOCYTES # BLD AUTO: 0.7 X10E3/UL (ref 0.1–0.9)
MONOCYTES NFR BLD AUTO: 8 %
MORPHOLOGY BLD-IMP: NORMAL
NEUTROPHILS # BLD AUTO: 5.2 X10E3/UL (ref 1.4–7)
NEUTROPHILS NFR BLD AUTO: 56 %
NRBC BLD AUTO-RTO: NORMAL %
PLATELET # BLD AUTO: 307 X10E3/UL (ref 150–450)
POTASSIUM SERPL-SCNC: 4.4 MMOL/L (ref 3.5–5.2)
PROT SERPL-MCNC: 6.7 G/DL (ref 6–8.5)
RBC # BLD AUTO: 4.39 X10E6/UL (ref 3.77–5.28)
SODIUM SERPL-SCNC: 140 MMOL/L (ref 134–144)
TRIGL SERPL-MCNC: 170 MG/DL (ref 0–149)
TSH SERPL DL<=0.005 MIU/L-ACNC: 2.99 UIU/ML (ref 0.45–4.5)
VLDLC SERPL CALC-MCNC: 34 MG/DL (ref 5–40)
WBC # BLD AUTO: 9.1 X10E3/UL (ref 3.4–10.8)

## 2020-05-08 RX ORDER — ERGOCALCIFEROL 1.25 MG/1
50000 CAPSULE ORAL
Qty: 12 CAP | Refills: 0 | Status: SHIPPED | OUTPATIENT
Start: 2020-05-08 | End: 2023-01-13

## 2020-05-09 DIAGNOSIS — K21.9 GASTROESOPHAGEAL REFLUX DISEASE WITHOUT ESOPHAGITIS: ICD-10-CM

## 2020-05-11 RX ORDER — OMEPRAZOLE 20 MG/1
CAPSULE, DELAYED RELEASE ORAL
Qty: 90 CAP | Refills: 0 | Status: SHIPPED | OUTPATIENT
Start: 2020-05-11 | End: 2022-12-14

## 2020-06-21 ENCOUNTER — TELEMEDICINE (OUTPATIENT)
Dept: TELEHEALTH | Facility: TELEMEDICINE | Age: 40
End: 2020-06-21
Payer: COMMERCIAL

## 2020-06-21 DIAGNOSIS — J30.2 SEASONAL ALLERGIES: ICD-10-CM

## 2020-06-21 PROCEDURE — 99213 OFFICE O/P EST LOW 20 MIN: CPT | Mod: 95,CR | Performed by: PHYSICIAN ASSISTANT

## 2020-06-21 RX ORDER — FLUTICASONE PROPIONATE 50 MCG
1 SPRAY, SUSPENSION (ML) NASAL DAILY
Qty: 16 G | Refills: 0 | Status: SHIPPED | OUTPATIENT
Start: 2020-06-21

## 2020-06-21 NOTE — PROGRESS NOTES
Telemedicine Visit: Established Patient     This encounter was conducted via Zoom.  Verbal consent was obtained. Patient's identity was verified.    Subjective:   CC: Sinus pressure, itchy eyes, scratchy throat, sneezing  Dorothy Diaz is a 40 y.o. female presenting for evaluation and management of:    This is a new problem.  Patient complains of bilateral maxillary sinus pressure, scratchy throat, sneezing, itchy eyes x4 days.  She did feel feverish once and took her temperature-100.6 orally.  She was in a warm space at the time.  Symptoms improved when she moved to an air conditioned space.  Patient took zyrtec and this did significantly help her symptoms.  She took Advil for headaches and fever and states that this also helps.  She denies body aches, diarrhea, shortness of breath, chest pain.  She does endorse a rare dry cough.  States that she does have history of seasonal allergies since moving to [place].    ROS   Denies any recent  chills. No nausea or vomiting. No chest pains or shortness of breath.     No Known Allergies    Current medicines (including changes today)  Current Outpatient Medications   Medication Sig Dispense Refill   • fluticasone (FLONASE) 50 MCG/ACT nasal spray Spray 1 Spray in nose every day. 16 g 0   • omeprazole (PRILOSEC) 20 MG delayed-release capsule TAKE 1 CAPSULE BY MOUTH EVERY DAY 90 Cap 0   • vitamin D, Ergocalciferol, (DRISDOL) 1.25 MG (86199 UT) Cap capsule Take 1 Cap by mouth every 7 days. 12 Cap 0   • Vitamin D 12.5 MCG/0.25ML Liquid Take  by mouth.       No current facility-administered medications for this visit.        Patient Active Problem List    Diagnosis Date Noted   • Bronchitis 03/06/2019   • Encounter for initial prescription of contraceptive pills 07/26/2018   • Prediabetes 04/26/2018   • Elevated ALT measurement 04/26/2018   • Obesity (BMI 30-39.9) 04/26/2018   • Annual physical exam 04/26/2018   • Environmental allergies 03/29/2018   • Family history of diabetes  "mellitus (DM) 03/29/2018   • Health care maintenance 03/29/2018   • Gastroesophageal reflux disease without esophagitis 03/29/2018   • Vitamin D deficiency 03/29/2018       Family History   Problem Relation Age of Onset   • Diabetes Mother    • Diabetes Father        She  has a past medical history of Allergy, Elevated ALT measurement (4/26/2018), Gastroesophageal reflux disease without esophagitis (3/29/2018), Prediabetes (4/26/2018), and Vitamin D deficiency (3/29/2018).  She  has no past surgical history on file.       Objective:   There were no vitals taken for this visit.    Physical Exam:  Constitutional: Alert, no distress, well-groomed.  Skin: No rashes in visible areas.  Eye: Mild periorbital edema and mild conjunctival injection bilateral. Round.  No icterus.   ENMT: Clear nasal discharge. Congestion. Pt has mild maxillary sinus pressure bilaterally with self palpation of these areas.  Lips pink without lesions, good dentition, moist mucous membranes. Phonation normal.  Neck: No masses, no thyromegaly. Moves freely without pain.  CV: Pulse as reported by patient  Respiratory: Rare dry cough. Frequent sniffling during exam. Unlabored respiratory effort, no cough or audible wheeze. Speaks comfortably and in complete sentences.  Psych: Alert and oriented x3, normal affect and mood.       Assessment and Plan:   The following treatment plan was discussed:     1. Seasonal allergies  - fluticasone (FLONASE) 50 MCG/ACT nasal spray; Spray 1 Spray in nose every day.  Dispense: 16 g; Refill: 0    History, symptoms, physical exam most consistent seasonal This does not look like COVID 19, other viral illness, or bacterial sinusitis at this time. However pt's report of fever is atypical. She was in a warm, unconditioned space at the time and \"fever\" immediately resolved upon moving to air conditioned space. Temp increase may have been environmental. Pt excellent response to zyrtec is reassuring. I would like patient to " continue zyrtec, start nasal saline TID, and start flonase. Advil prn. If sx fail to improve in 72 hrs, fevers recur, new symptoms develop, or symptoms worsen- pt to come in to be seen virtual. Any SOB pt to come into clinic to be seen. Any severe symptom- to ED.    Follow-up: Follow up for failure of sx to resolve or with any questions or concerns.

## 2022-05-31 ENCOUNTER — APPOINTMENT (OUTPATIENT)
Dept: RADIOLOGY | Facility: IMAGING CENTER | Age: 42
End: 2022-05-31
Attending: FAMILY MEDICINE
Payer: COMMERCIAL

## 2022-05-31 ENCOUNTER — OFFICE VISIT (OUTPATIENT)
Dept: URGENT CARE | Facility: PHYSICIAN GROUP | Age: 42
End: 2022-05-31
Payer: COMMERCIAL

## 2022-05-31 VITALS
HEIGHT: 61 IN | TEMPERATURE: 97.1 F | RESPIRATION RATE: 16 BRPM | OXYGEN SATURATION: 98 % | HEART RATE: 62 BPM | SYSTOLIC BLOOD PRESSURE: 106 MMHG | DIASTOLIC BLOOD PRESSURE: 60 MMHG | BODY MASS INDEX: 29.27 KG/M2 | WEIGHT: 155 LBS

## 2022-05-31 DIAGNOSIS — M79.675 PAIN OF TOE OF LEFT FOOT: ICD-10-CM

## 2022-05-31 PROCEDURE — 73660 X-RAY EXAM OF TOE(S): CPT | Mod: TC,LT | Performed by: RADIOLOGY

## 2022-05-31 PROCEDURE — 99213 OFFICE O/P EST LOW 20 MIN: CPT | Performed by: FAMILY MEDICINE

## 2022-05-31 NOTE — PROGRESS NOTES
"Chief Complaint   Patient presents with   • Toe Injury     Left pinky toe, was in shower and hit toe, happened this morning        Chief Complaint   Patient presents with   • Toe Injury     Left pinky toe, was in shower and hit toe, happened this morning          C/o constant, sharp left 5th toe pain x 1 day - she hit it in the shower.    Pain is worse with wtbearing.    Motrin helps, \"a little.\"      No change in activity or exercise recently.           Pertinent negatives include no  loss of motion, muscle weakness, numbness or tingling. The symptoms are aggravated by weight bearing and palpation.          Social History     Tobacco Use   • Smoking status: Never Smoker   • Smokeless tobacco: Never Used   Vaping Use   • Vaping Use: Never used   Substance Use Topics   • Alcohol use: No   • Drug use: No         Past Medical History:   Diagnosis Date   • Prediabetes 4/26/2018   • Elevated ALT measurement 4/26/2018   • Gastroesophageal reflux disease without esophagitis 3/29/2018   • Vitamin D deficiency 3/29/2018   • Allergy          Family hx was reviewed - no pertinent past family hx        Review of Systems   Constitutional: Negative for fever, chills and weight loss.   HENT - denies cough, ear pain, congestion, sore throat  Eyes: denies vision changes, discharge  Respiratory: Negative for cough and wheezing.    Cardiovascular: Negative for chest pain or PND.   Gastrointestinal:  No abdominal pain,  nausea, vomiting, diarrhea.  Negative for  blood in stool.    - no discharge, dysuria, frequency.      Neurological: Negative for dizziness and headaches.   musculoskeletal - denies myalgias, calf pain  Psych - denies anxiety/depression/mood changes.  Skin: no itching or rash  All other systems reviewed and are negative.           Objective:     /60 (BP Location: Left arm, Patient Position: Sitting, BP Cuff Size: Adult)   Pulse 62   Temp 36.2 °C (97.1 °F) (Temporal)   Resp 16   Ht 1.549 m (5' 1\")   Wt 70.3 " kg (155 lb)   SpO2 98%         Physical Exam   Constitutional: pt is oriented to person, place, and time. Pt appears well-developed. No distress.   HENT:   Head: Normocephalic and atraumatic.   Eyes: Conjunctivae are normal.   Cardiovascular: Normal rate and regular rhythm.    Pulmonary/Chest: Effort normal and breath sounds normal.   Musculoskeletal:        Left ankle: pt exhibits no edema or ecchymosis.    No medial or lateral malleolus tenderness noted.        Achilles tendon normal.        Left foot: + tender to palpation over 5th toe.   No bruising or edema or obvious injury.    There is normal range of motion, normal capillary refill and no crepitus.   Neurological: pt is alert and oriented to person, place, and time. No cranial nerve deficit.   Skin: Skin is warm. Pt is not diaphoretic. No erythema.   Psychiatric:  behavior is normal.   Nursing note and vitals reviewed.    Details    Reading Physician Reading Date Result Priority   Edi Murillo M.D.  [phone] 5/31/2022 [facility]     Narrative & Impression     5/31/2022 12:05 PM     HISTORY/REASON FOR EXAM:  Pain/Deformity Following Trauma; LEFT 5TH TOE.     TECHNIQUE/EXAM DESCRIPTION AND NUMBER OF VIEWS:  3 views of the LEFT toes.     COMPARISON: None     FINDINGS:  No acute fracture or subluxation is seen.     There is normal variant developmental fusion of the fourth and fifth DIP joints     Joint spaces are preserved     There is a small fifth metatarsal base bone island     IMPRESSION:     Negative fifth ray radiographs             Exam Ended: 05/31/22 12:17 PM Last Resulted: 05/31/22 12:22 PM             Assessment & Plan        1. Pain of toe of left foot   X-rays were personally reviewed by myself.   There is no fracture     rx motrin 800mg tid prn    Follow up in one week if no improvement, sooner if symptoms worsen.

## 2022-12-14 ENCOUNTER — TELEPHONE (OUTPATIENT)
Dept: MEDICAL GROUP | Facility: MEDICAL CENTER | Age: 42
End: 2022-12-14

## 2022-12-14 ENCOUNTER — HOSPITAL ENCOUNTER (OUTPATIENT)
Facility: MEDICAL CENTER | Age: 42
End: 2022-12-14
Attending: NURSE PRACTITIONER
Payer: COMMERCIAL

## 2022-12-14 ENCOUNTER — OFFICE VISIT (OUTPATIENT)
Dept: MEDICAL GROUP | Facility: MEDICAL CENTER | Age: 42
End: 2022-12-14
Payer: COMMERCIAL

## 2022-12-14 VITALS
HEIGHT: 61 IN | BODY MASS INDEX: 31.89 KG/M2 | DIASTOLIC BLOOD PRESSURE: 70 MMHG | SYSTOLIC BLOOD PRESSURE: 114 MMHG | WEIGHT: 168.9 LBS | OXYGEN SATURATION: 100 % | HEART RATE: 60 BPM | TEMPERATURE: 96.7 F

## 2022-12-14 DIAGNOSIS — Z91.89 ENCOUNTER FOR HEPATITIS C VIRUS SCREENING TEST FOR HIGH RISK PATIENT: ICD-10-CM

## 2022-12-14 DIAGNOSIS — Z12.4 PAP SMEAR FOR CERVICAL CANCER SCREENING: ICD-10-CM

## 2022-12-14 DIAGNOSIS — E55.9 VITAMIN D DEFICIENCY: ICD-10-CM

## 2022-12-14 DIAGNOSIS — Z11.59 ENCOUNTER FOR HEPATITIS C VIRUS SCREENING TEST FOR HIGH RISK PATIENT: ICD-10-CM

## 2022-12-14 DIAGNOSIS — Z23 NEED FOR VACCINATION: ICD-10-CM

## 2022-12-14 DIAGNOSIS — R73.03 PREDIABETES: ICD-10-CM

## 2022-12-14 DIAGNOSIS — Z00.00 ANNUAL PHYSICAL EXAM: ICD-10-CM

## 2022-12-14 PROCEDURE — 88175 CYTOPATH C/V AUTO FLUID REDO: CPT

## 2022-12-14 PROCEDURE — 87591 N.GONORRHOEAE DNA AMP PROB: CPT

## 2022-12-14 PROCEDURE — 87624 HPV HI-RISK TYP POOLED RSLT: CPT

## 2022-12-14 PROCEDURE — 87491 CHLMYD TRACH DNA AMP PROBE: CPT

## 2022-12-14 PROCEDURE — 99396 PREV VISIT EST AGE 40-64: CPT | Mod: 25 | Performed by: NURSE PRACTITIONER

## 2022-12-14 SDOH — ECONOMIC STABILITY: FOOD INSECURITY: WITHIN THE PAST 12 MONTHS, YOU WORRIED THAT YOUR FOOD WOULD RUN OUT BEFORE YOU GOT MONEY TO BUY MORE.: NEVER TRUE

## 2022-12-14 SDOH — ECONOMIC STABILITY: INCOME INSECURITY: HOW HARD IS IT FOR YOU TO PAY FOR THE VERY BASICS LIKE FOOD, HOUSING, MEDICAL CARE, AND HEATING?: NOT HARD AT ALL

## 2022-12-14 SDOH — ECONOMIC STABILITY: TRANSPORTATION INSECURITY
IN THE PAST 12 MONTHS, HAS LACK OF RELIABLE TRANSPORTATION KEPT YOU FROM MEDICAL APPOINTMENTS, MEETINGS, WORK OR FROM GETTING THINGS NEEDED FOR DAILY LIVING?: NO

## 2022-12-14 SDOH — ECONOMIC STABILITY: HOUSING INSECURITY
IN THE LAST 12 MONTHS, WAS THERE A TIME WHEN YOU DID NOT HAVE A STEADY PLACE TO SLEEP OR SLEPT IN A SHELTER (INCLUDING NOW)?: NO

## 2022-12-14 SDOH — ECONOMIC STABILITY: FOOD INSECURITY: WITHIN THE PAST 12 MONTHS, THE FOOD YOU BOUGHT JUST DIDN'T LAST AND YOU DIDN'T HAVE MONEY TO GET MORE.: NEVER TRUE

## 2022-12-14 SDOH — ECONOMIC STABILITY: HOUSING INSECURITY: IN THE LAST 12 MONTHS, HOW MANY PLACES HAVE YOU LIVED?: 1

## 2022-12-14 SDOH — HEALTH STABILITY: PHYSICAL HEALTH: ON AVERAGE, HOW MANY MINUTES DO YOU ENGAGE IN EXERCISE AT THIS LEVEL?: 0 MIN

## 2022-12-14 SDOH — HEALTH STABILITY: PHYSICAL HEALTH: ON AVERAGE, HOW MANY DAYS PER WEEK DO YOU ENGAGE IN MODERATE TO STRENUOUS EXERCISE (LIKE A BRISK WALK)?: 0 DAYS

## 2022-12-14 SDOH — HEALTH STABILITY: MENTAL HEALTH
STRESS IS WHEN SOMEONE FEELS TENSE, NERVOUS, ANXIOUS, OR CAN'T SLEEP AT NIGHT BECAUSE THEIR MIND IS TROUBLED. HOW STRESSED ARE YOU?: NOT AT ALL

## 2022-12-14 SDOH — ECONOMIC STABILITY: INCOME INSECURITY: IN THE LAST 12 MONTHS, WAS THERE A TIME WHEN YOU WERE NOT ABLE TO PAY THE MORTGAGE OR RENT ON TIME?: NO

## 2022-12-14 SDOH — ECONOMIC STABILITY: TRANSPORTATION INSECURITY
IN THE PAST 12 MONTHS, HAS THE LACK OF TRANSPORTATION KEPT YOU FROM MEDICAL APPOINTMENTS OR FROM GETTING MEDICATIONS?: NO

## 2022-12-14 SDOH — ECONOMIC STABILITY: TRANSPORTATION INSECURITY
IN THE PAST 12 MONTHS, HAS LACK OF TRANSPORTATION KEPT YOU FROM MEETINGS, WORK, OR FROM GETTING THINGS NEEDED FOR DAILY LIVING?: NO

## 2022-12-14 ASSESSMENT — LIFESTYLE VARIABLES
SKIP TO QUESTIONS 9-10: 1
HOW OFTEN DO YOU HAVE SIX OR MORE DRINKS ON ONE OCCASION: NEVER
AUDIT-C TOTAL SCORE: 0
HOW OFTEN DO YOU HAVE A DRINK CONTAINING ALCOHOL: NEVER
HOW MANY STANDARD DRINKS CONTAINING ALCOHOL DO YOU HAVE ON A TYPICAL DAY: PATIENT DOES NOT DRINK

## 2022-12-14 ASSESSMENT — SOCIAL DETERMINANTS OF HEALTH (SDOH)
ARE YOU MARRIED, WIDOWED, DIVORCED, SEPARATED, NEVER MARRIED, OR LIVING WITH A PARTNER?: NEVER MARRIED
HOW HARD IS IT FOR YOU TO PAY FOR THE VERY BASICS LIKE FOOD, HOUSING, MEDICAL CARE, AND HEATING?: NOT HARD AT ALL
DO YOU BELONG TO ANY CLUBS OR ORGANIZATIONS SUCH AS CHURCH GROUPS UNIONS, FRATERNAL OR ATHLETIC GROUPS, OR SCHOOL GROUPS?: YES
DO YOU BELONG TO ANY CLUBS OR ORGANIZATIONS SUCH AS CHURCH GROUPS UNIONS, FRATERNAL OR ATHLETIC GROUPS, OR SCHOOL GROUPS?: YES
HOW OFTEN DO YOU GET TOGETHER WITH FRIENDS OR RELATIVES?: MORE THAN THREE TIMES A WEEK
HOW OFTEN DO YOU ATTENT MEETINGS OF THE CLUB OR ORGANIZATION YOU BELONG TO?: MORE THAN 4 TIMES PER YEAR
HOW OFTEN DO YOU ATTEND CHURCH OR RELIGIOUS SERVICES?: MORE THAN 4 TIMES PER YEAR
ARE YOU MARRIED, WIDOWED, DIVORCED, SEPARATED, NEVER MARRIED, OR LIVING WITH A PARTNER?: NEVER MARRIED
HOW OFTEN DO YOU HAVE SIX OR MORE DRINKS ON ONE OCCASION: NEVER
HOW OFTEN DO YOU GET TOGETHER WITH FRIENDS OR RELATIVES?: MORE THAN THREE TIMES A WEEK
IN A TYPICAL WEEK, HOW MANY TIMES DO YOU TALK ON THE PHONE WITH FAMILY, FRIENDS, OR NEIGHBORS?: MORE THAN THREE TIMES A WEEK
HOW MANY DRINKS CONTAINING ALCOHOL DO YOU HAVE ON A TYPICAL DAY WHEN YOU ARE DRINKING: PATIENT DOES NOT DRINK
WITHIN THE PAST 12 MONTHS, YOU WORRIED THAT YOUR FOOD WOULD RUN OUT BEFORE YOU GOT THE MONEY TO BUY MORE: NEVER TRUE
IN A TYPICAL WEEK, HOW MANY TIMES DO YOU TALK ON THE PHONE WITH FAMILY, FRIENDS, OR NEIGHBORS?: MORE THAN THREE TIMES A WEEK
HOW OFTEN DO YOU HAVE A DRINK CONTAINING ALCOHOL: NEVER
HOW OFTEN DO YOU ATTENT MEETINGS OF THE CLUB OR ORGANIZATION YOU BELONG TO?: MORE THAN 4 TIMES PER YEAR
HOW OFTEN DO YOU ATTEND CHURCH OR RELIGIOUS SERVICES?: MORE THAN 4 TIMES PER YEAR

## 2022-12-14 ASSESSMENT — PATIENT HEALTH QUESTIONNAIRE - PHQ9: CLINICAL INTERPRETATION OF PHQ2 SCORE: 0

## 2022-12-14 NOTE — PROGRESS NOTES
SUBJECTIVE: 42 y.o. female for annual routine gynecologic exam  No chief complaint on file.      OB History    Para Term  AB Living   1 0 0 0 1 0   SAB IAB Ectopic Molar Multiple Live Births   0 0 0 0 0 0      Last Pap: unknown, maybe 3 years ago  Social History     Substance and Sexual Activity   Sexual Activity Not Currently     Sexual history: previously sexually active but currently abstaining   H/O Abnormal Pap yes  She  reports that she has never smoked. She has never used smokeless tobacco.        Allergies: Patient has no known allergies.     ROS:    Reports no menopause symptoms of hot flashes, night sweats, sleep disruption, mood changes.Denies vaginal dryness.   Menses every month with 3 days moderate bleeding   Cramping is mild.   She does not take OTC analgesics for cramping  No significant bloating/fluid retention, pelvic pain, or dyspareunia. No vaginal discharge   No breast tenderness, dimpling, mass, nipple discharge, skin changes, changes in size or contour, or abnormal cyclic discomfort.  No urinary tract symptoms, no incontinence, no polydipsia, polyuria,  No abdominal pain, change in bowel habits, black or bloody stools.    No unusual headaches, no visual changes, menstrual migraines   No prolonged cough. No dyspnea or chest pain on exertion.  No depression, labile mood, anxiety, libido changes, insomnia.  No temperature intolerance.  No new/concerning skin lesions, concerns.     Current medicines (including changes today)  Current Outpatient Medications   Medication Sig Dispense Refill    fluticasone (FLONASE) 50 MCG/ACT nasal spray Spray 1 Spray in nose every day. 16 g 0    omeprazole (PRILOSEC) 20 MG delayed-release capsule TAKE 1 CAPSULE BY MOUTH EVERY DAY (Patient not taking: Reported on 2022) 90 Cap 0    vitamin D, Ergocalciferol, (DRISDOL) 1.25 MG (43538 UT) Cap capsule Take 1 Cap by mouth every 7 days. 12 Cap 0    Vitamin D 12.5 MCG/0.25ML Liquid Take  by mouth.    "    No current facility-administered medications for this visit.     She  has a past medical history of Allergy, Elevated ALT measurement (4/26/2018), Gastroesophageal reflux disease without esophagitis (3/29/2018), Prediabetes (4/26/2018), and Vitamin D deficiency (3/29/2018).  She  has no past surgical history on file.     Family History:   Family History   Problem Relation Age of Onset    Diabetes Mother     Diabetes Father        OBJECTIVE:   /70 (BP Location: Right arm, Patient Position: Sitting, BP Cuff Size: Adult)   Pulse 60   Temp 35.9 °C (96.7 °F) (Temporal)   Ht 1.549 m (5' 1\")   Wt 76.6 kg (168 lb 14.4 oz)   SpO2 100%   BMI 31.91 kg/m²   Body mass index is 31.91 kg/m².    General/Constitutional: Vitals as above, Well nourished, well developed female in no acute distress  HEAD AND NECK:  Ears normal.  Throat, oral cavity and tongue normal.  Neck supple. No adenopathy or masses in the neck or supraclavicular regions. No thyromegaly.   PSYCH: Judgment grossly appropriate, no apparent depression/anxiety  CHEST:  Clear, good air entry, no wheezes or rales.   HEART:  Regular rate and rhythm.  S1 and S2 normal. No edema  ABDOMEN:  Soft without tenderness, guarding, mass or organomegaly.  No CVA tenderness or inguinal adenopathy.   Genitourinary: Grossly normal external female genitalia with skin within normal limits,   EXTREMITIES:  Extremities and peripheral pulses are normal.   SKIN: color normal, vascularity normal, no edema, temperature normal   No rashes or suspicious skin lesions noted.     Breast Exam: Symmetrical, normal consistency without masses., No dimpling or skin changes, Normal nipples without discharge, no axillary lymphadenopathy  Pelvic Exam -  Normal external genitalia with no lesions. Vaginal Mucosa:  normal vaginal mucosa, normal discharge . no adnexal masses or tenderness, no cervical motion tenderness, no uterine tenderness, speculum used with warm water as lubrication, Cervix " well visualized with  discharge/friability/bleeding, cervical broom used to obtain cervical specimen, Cervix with no visible lesions. Thin Prep Pap is obtained, vaginal swab is not obtained and specimen(s) sent to lab    <ASSESSMENT and PLAN>  1. Pap smear for cervical cancer screening  Pap complete  THINPREP PAP W/HPV AND CTNG      2. Annual physical exam  Annual labs ordered  CBC WITH DIFFERENTIAL    Comp Metabolic Panel    HEMOGLOBIN A1C    Lipid Profile    TSH WITH REFLEX TO FT4    VITAMIN D,25 HYDROXY (DEFICIENCY)      3. Vitamin D deficiency  VITAMIN D,25 HYDROXY (DEFICIENCY)      4. Prediabetes  HEMOGLOBIN A1C      5. Encounter for hepatitis C virus screening test for high risk patient  HEP C VIRUS ANTIBODY      6. Need for vaccination  INFLUENZA VACCINE QUAD INJ (PF)          Discussed  breast self exam, STD prevention   Follow-up in 3 years for next Gyn exam and 5 years for next Pap.   Next office visit for recheck of chronic medical conditions is due in 1 month    The MA is performing the above orders under the direction of Dr. Radford    Please note that this dictation was created using voice recognition software. I have made every reasonable attempt to correct obvious errors, but I expect that there are errors of grammar and possibly content that I did not discover before finalizing the note.

## 2022-12-14 NOTE — TELEPHONE ENCOUNTER
Per Mrs Brito called patient we didn't give her the flu shot  Daryl placed the order if she would like it placed she can come to clinic to have it placed left voice mail to return call

## 2022-12-15 DIAGNOSIS — Z12.4 PAP SMEAR FOR CERVICAL CANCER SCREENING: ICD-10-CM

## 2022-12-15 LAB
AMBIGUOUS DTTM AMBI4: NORMAL
C TRACH DNA GENITAL QL NAA+PROBE: NEGATIVE
CYTOLOGY REG CYTOL: NORMAL
HPV HR 12 DNA CVX QL NAA+PROBE: NEGATIVE
HPV16 DNA SPEC QL NAA+PROBE: NEGATIVE
HPV18 DNA SPEC QL NAA+PROBE: NEGATIVE
N GONORRHOEA DNA GENITAL QL NAA+PROBE: NEGATIVE
SPECIMEN SOURCE: NORMAL
SPECIMEN SOURCE: NORMAL

## 2022-12-30 LAB
25(OH)D3+25(OH)D2 SERPL-MCNC: 9.2 NG/ML (ref 30–100)
ALBUMIN SERPL-MCNC: 4.7 G/DL (ref 3.8–4.8)
ALBUMIN/GLOB SERPL: 1.8 {RATIO} (ref 1.2–2.2)
ALP SERPL-CCNC: 76 IU/L (ref 44–121)
ALT SERPL-CCNC: 54 IU/L (ref 0–32)
AMBIG ABBREV CMP14 DFLT   977206: NORMAL
AMBIG ABBREV LP  977212: NORMAL
AST SERPL-CCNC: 32 IU/L (ref 0–40)
BASOPHILS # BLD AUTO: 0 X10E3/UL (ref 0–0.2)
BASOPHILS NFR BLD AUTO: 1 %
BILIRUB SERPL-MCNC: 0.6 MG/DL (ref 0–1.2)
BUN SERPL-MCNC: 12 MG/DL (ref 6–24)
BUN/CREAT SERPL: 16 (ref 9–23)
CALCIUM SERPL-MCNC: 8.9 MG/DL (ref 8.7–10.2)
CHLORIDE SERPL-SCNC: 104 MMOL/L (ref 96–106)
CHOLEST SERPL-MCNC: 222 MG/DL (ref 100–199)
CO2 SERPL-SCNC: 20 MMOL/L (ref 20–29)
CREAT SERPL-MCNC: 0.75 MG/DL (ref 0.57–1)
EGFRCR SERPLBLD CKD-EPI 2021: 103 ML/MIN/1.73
EOSINOPHIL # BLD AUTO: 0.1 X10E3/UL (ref 0–0.4)
EOSINOPHIL NFR BLD AUTO: 2 %
ERYTHROCYTE [DISTWIDTH] IN BLOOD BY AUTOMATED COUNT: 12.2 % (ref 11.7–15.4)
GLOBULIN SER CALC-MCNC: 2.6 G/DL (ref 1.5–4.5)
GLUCOSE SERPL-MCNC: 155 MG/DL (ref 70–99)
HBA1C MFR BLD: 6.8 % (ref 4.8–5.6)
HCT VFR BLD AUTO: 40.6 % (ref 34–46.6)
HCV AB S/CO SERPL IA: 0.2 S/CO RATIO (ref 0–0.9)
HDLC SERPL-MCNC: 49 MG/DL
HGB BLD-MCNC: 13.6 G/DL (ref 11.1–15.9)
IMM GRANULOCYTES # BLD AUTO: 0.1 X10E3/UL (ref 0–0.1)
IMM GRANULOCYTES NFR BLD AUTO: 1 %
IMMATURE CELLS  115398: NORMAL
LABORATORY COMMENT REPORT: ABNORMAL
LDLC SERPL CALC-MCNC: 142 MG/DL (ref 0–99)
LYMPHOCYTES # BLD AUTO: 2.7 X10E3/UL (ref 0.7–3.1)
LYMPHOCYTES NFR BLD AUTO: 32 %
MCH RBC QN AUTO: 30 PG (ref 26.6–33)
MCHC RBC AUTO-ENTMCNC: 33.5 G/DL (ref 31.5–35.7)
MCV RBC AUTO: 89 FL (ref 79–97)
MONOCYTES # BLD AUTO: 0.6 X10E3/UL (ref 0.1–0.9)
MONOCYTES NFR BLD AUTO: 7 %
MORPHOLOGY BLD-IMP: NORMAL
NEUTROPHILS # BLD AUTO: 5.1 X10E3/UL (ref 1.4–7)
NEUTROPHILS NFR BLD AUTO: 57 %
NRBC BLD AUTO-RTO: NORMAL %
PLATELET # BLD AUTO: 300 X10E3/UL (ref 150–450)
POTASSIUM SERPL-SCNC: 4 MMOL/L (ref 3.5–5.2)
PROT SERPL-MCNC: 7.3 G/DL (ref 6–8.5)
RBC # BLD AUTO: 4.54 X10E6/UL (ref 3.77–5.28)
SODIUM SERPL-SCNC: 139 MMOL/L (ref 134–144)
TRIGL SERPL-MCNC: 174 MG/DL (ref 0–149)
TSH SERPL DL<=0.005 MIU/L-ACNC: 3.8 UIU/ML (ref 0.45–4.5)
VLDLC SERPL CALC-MCNC: 31 MG/DL (ref 5–40)
WBC # BLD AUTO: 8.6 X10E3/UL (ref 3.4–10.8)

## 2023-01-13 ENCOUNTER — OFFICE VISIT (OUTPATIENT)
Dept: MEDICAL GROUP | Facility: MEDICAL CENTER | Age: 43
End: 2023-01-13
Payer: COMMERCIAL

## 2023-01-13 VITALS
HEART RATE: 64 BPM | OXYGEN SATURATION: 97 % | DIASTOLIC BLOOD PRESSURE: 74 MMHG | SYSTOLIC BLOOD PRESSURE: 116 MMHG | WEIGHT: 169 LBS | TEMPERATURE: 97.6 F | HEIGHT: 69 IN | BODY MASS INDEX: 25.03 KG/M2

## 2023-01-13 DIAGNOSIS — E78.2 MIXED HYPERLIPIDEMIA: ICD-10-CM

## 2023-01-13 DIAGNOSIS — Z23 NEED FOR VACCINATION: ICD-10-CM

## 2023-01-13 DIAGNOSIS — R74.01 ELEVATED ALT MEASUREMENT: ICD-10-CM

## 2023-01-13 DIAGNOSIS — E55.9 VITAMIN D DEFICIENCY: ICD-10-CM

## 2023-01-13 DIAGNOSIS — E11.65 TYPE 2 DIABETES MELLITUS WITH HYPERGLYCEMIA, WITHOUT LONG-TERM CURRENT USE OF INSULIN (HCC): ICD-10-CM

## 2023-01-13 PROCEDURE — 90686 IIV4 VACC NO PRSV 0.5 ML IM: CPT | Performed by: NURSE PRACTITIONER

## 2023-01-13 PROCEDURE — 90471 IMMUNIZATION ADMIN: CPT | Performed by: NURSE PRACTITIONER

## 2023-01-13 PROCEDURE — 99214 OFFICE O/P EST MOD 30 MIN: CPT | Mod: 25 | Performed by: NURSE PRACTITIONER

## 2023-01-13 RX ORDER — ERGOCALCIFEROL 1.25 MG/1
50000 CAPSULE ORAL
Qty: 12 CAPSULE | Refills: 0 | Status: SHIPPED | OUTPATIENT
Start: 2023-01-13 | End: 2023-04-05

## 2023-01-13 ASSESSMENT — FIBROSIS 4 INDEX: FIB4 SCORE: 0.6

## 2023-01-13 ASSESSMENT — PATIENT HEALTH QUESTIONNAIRE - PHQ9: CLINICAL INTERPRETATION OF PHQ2 SCORE: 0

## 2023-01-13 NOTE — PROGRESS NOTES
"Subjective:   Dorothy Dotson is a 42 y.o. female here today for lab review    Type 2 diabetes mellitus with hyperglycemia, without long-term current use of insulin ([facility])  New diagnosis today. A1C 6.8, has been prediabetic for years. Mother, father, and brother are diabetic.     Reports eating moderate carbs and sugar. Eating at work ([employer]) most days.     Mixed hyperlipidemia  Chronic. Worse on recent labs. Diet has been poor. Not exercising.        Current medicines (including changes today)  Current Outpatient Medications   Medication Sig Dispense Refill    ergocalciferol (DRISDOL) 12491 UNIT capsule Take 1 Capsule by mouth every 7 days. 12 Capsule 0    fluticasone (FLONASE) 50 MCG/ACT nasal spray Spray 1 Spray in nose every day. 16 g 0    Vitamin D 12.5 MCG/0.25ML Liquid Take  by mouth.       No current facility-administered medications for this visit.     She  has a past medical history of Allergy, Elevated ALT measurement (4/26/2018), Gastroesophageal reflux disease without esophagitis (3/29/2018), Prediabetes (4/26/2018), and Vitamin D deficiency (3/29/2018).    ROS   No chest pain, no shortness of breath, no abdominal pain  Positive ROS as per HPI.  All other systems reviewed and are negative.     Objective:     /74 (BP Location: Right arm, Patient Position: Sitting, BP Cuff Size: Adult)   Pulse 64   Temp 36.4 °C (97.6 °F) (Temporal)   Ht 1.753 m (5' 9\")   Wt 76.7 kg (169 lb)   SpO2 97%  Body mass index is 24.96 kg/m².     Physical Exam:  Constitutional: Alert, no distress.  Skin: Warm, dry, good turgor, no rashes in visible areas.  Eye: Equal, round and reactive, conjunctiva clear, lids normal.  ENMT: Lips without lesions, good dentition, oropharynx clear.  Neck: Trachea midline, no masses, no thyromegaly. No cervical or supraclavicular lymphadenopathy  Respiratory: Unlabored respiratory effort, lungs clear to auscultation, no wheezes, no ronchi.  Cardiovascular: Normal S1, S2, no murmur, " no edema.  Abdomen: Soft, non-tender, no masses, no hepatosplenomegaly.  Psych: Alert and oriented x3, normal affect and mood.        Assessment and Plan:   The following treatment plan was discussed    1. Type 2 diabetes mellitus with hyperglycemia, without long-term current use of insulin (HCC)  Unstable  Discussed diet, exercise, weight loss, and medication options  Patient would like to start first with lifestyle changes and monitor for now  Repeat A1C 3 months  - HEMOGLOBIN A1C; Future  - Microalbumin Creat Ratio Urine - Lab Collect; Future    2. Mixed hyperlipidemia  Unstable   D repeat labs 3 months iet, exercise, weight loss discussed in depth    - Lipid Profile; Future    3. Elevated ALT measurement  Unstable, likely NAFLD  Diet, exercise, weight loss discussed  Repeat liver function tests in 3 months  - HEPATIC FUNCTION PANEL; Future    4. Vitamin D deficiency  Unstable  Vitamin D2 weekly for 12 weeks, then vitamin D3 daily thereafter  - ergocalciferol (DRISDOL) 28453 UNIT capsule; Take 1 Capsule by mouth every 7 days.  Dispense: 12 Capsule; Refill: 0    5. Need for vaccination  - INFLUENZA VACCINE QUAD INJ (PF)          Followup: Return in about 3 months (around 4/13/2023) for Diabetes, Lab Review.    The MA is performing the above orders under the direction of Dr. Radford    Please note that this dictation was created using voice recognition software. I have made every reasonable attempt to correct obvious errors, but I expect that there are errors of grammar and possibly content that I did not discover before finalizing the note.

## 2023-01-13 NOTE — ASSESSMENT & PLAN NOTE
New diagnosis today. A1C 6.8, has been prediabetic for years. Mother, father, and brother are diabetic.     Reports eating moderate carbs and sugar. Eating at work ([employer]) most days.

## 2023-04-05 DIAGNOSIS — E55.9 VITAMIN D DEFICIENCY: ICD-10-CM

## 2023-04-05 RX ORDER — ERGOCALCIFEROL 1.25 MG/1
CAPSULE ORAL
Qty: 12 CAPSULE | Refills: 0 | Status: SHIPPED | OUTPATIENT
Start: 2023-04-05

## 2023-04-17 ENCOUNTER — APPOINTMENT (OUTPATIENT)
Dept: MEDICAL GROUP | Facility: MEDICAL CENTER | Age: 43
End: 2023-04-17
Payer: COMMERCIAL

## 2023-05-04 LAB
ALBUMIN SERPL-MCNC: 4.7 G/DL (ref 3.8–4.8)
ALP SERPL-CCNC: 63 IU/L (ref 44–121)
ALT SERPL-CCNC: 37 IU/L (ref 0–32)
AST SERPL-CCNC: 28 IU/L (ref 0–40)
BILIRUB DIRECT SERPL-MCNC: 0.11 MG/DL (ref 0–0.4)
BILIRUB SERPL-MCNC: 0.4 MG/DL (ref 0–1.2)
CHOLEST SERPL-MCNC: 204 MG/DL (ref 100–199)
HBA1C MFR BLD: 6.8 % (ref 4.8–5.6)
HDLC SERPL-MCNC: 42 MG/DL
LABORATORY COMMENT REPORT: ABNORMAL
LDLC SERPL CALC-MCNC: 126 MG/DL (ref 0–99)
PROT SERPL-MCNC: 6.8 G/DL (ref 6–8.5)
TRIGL SERPL-MCNC: 201 MG/DL (ref 0–149)
VLDLC SERPL CALC-MCNC: 36 MG/DL (ref 5–40)

## 2023-05-05 ENCOUNTER — OFFICE VISIT (OUTPATIENT)
Dept: MEDICAL GROUP | Facility: MEDICAL CENTER | Age: 43
End: 2023-05-05
Payer: COMMERCIAL

## 2023-05-05 ENCOUNTER — HOSPITAL ENCOUNTER (OUTPATIENT)
Facility: MEDICAL CENTER | Age: 43
End: 2023-05-05
Attending: NURSE PRACTITIONER
Payer: COMMERCIAL

## 2023-05-05 VITALS
SYSTOLIC BLOOD PRESSURE: 106 MMHG | WEIGHT: 166 LBS | TEMPERATURE: 96.8 F | BODY MASS INDEX: 31.34 KG/M2 | DIASTOLIC BLOOD PRESSURE: 62 MMHG | OXYGEN SATURATION: 98 % | HEART RATE: 59 BPM | RESPIRATION RATE: 16 BRPM | HEIGHT: 61 IN

## 2023-05-05 DIAGNOSIS — E78.2 MIXED HYPERLIPIDEMIA: ICD-10-CM

## 2023-05-05 DIAGNOSIS — E11.65 TYPE 2 DIABETES MELLITUS WITH HYPERGLYCEMIA, WITHOUT LONG-TERM CURRENT USE OF INSULIN (HCC): ICD-10-CM

## 2023-05-05 DIAGNOSIS — Z23 NEED FOR VACCINATION: ICD-10-CM

## 2023-05-05 DIAGNOSIS — E66.9 OBESITY (BMI 30-39.9): ICD-10-CM

## 2023-05-05 PROCEDURE — 82043 UR ALBUMIN QUANTITATIVE: CPT

## 2023-05-05 PROCEDURE — 99214 OFFICE O/P EST MOD 30 MIN: CPT | Mod: 25 | Performed by: NURSE PRACTITIONER

## 2023-05-05 PROCEDURE — 90471 IMMUNIZATION ADMIN: CPT | Performed by: NURSE PRACTITIONER

## 2023-05-05 PROCEDURE — 82570 ASSAY OF URINE CREATININE: CPT

## 2023-05-05 PROCEDURE — 90677 PCV20 VACCINE IM: CPT | Performed by: NURSE PRACTITIONER

## 2023-05-05 ASSESSMENT — FIBROSIS 4 INDEX: FIB4 SCORE: 0.63

## 2023-05-05 NOTE — ASSESSMENT & PLAN NOTE
Chronic.  LDL improved on repeat labs, triglycerides worsen.  Has been working on diet changes, exercise, weight loss.  Feels like she could do better in these areas.

## 2023-05-05 NOTE — PROGRESS NOTES
"Subjective:   Dorothy Dotson is a 42 y.o. female here today for diabetes follow-up, lab review    Type 2 diabetes mellitus with hyperglycemia, without long-term current use of insulin ([facility])  A1C 6.8, unchanged from last visit, has been prediabetic for years. Mother, father, and brother are diabetic.     Has been lowering carbs and sugar. Eating at work ([employer]) most days.     Mixed hyperlipidemia  Chronic.  LDL improved on repeat labs, triglycerides worsen.  Has been working on diet changes, exercise, weight loss.  Feels like she could do better in these areas.       Current medicines (including changes today)  Current Outpatient Medications   Medication Sig Dispense Refill    vitamin D2, Ergocalciferol, (DRISDOL) 1.25 MG (24826 UT) Cap capsule TAKE 1 CAPSULE BY MOUTH EVERY 7 DAYS 12 Capsule 0    fluticasone (FLONASE) 50 MCG/ACT nasal spray Spray 1 Spray in nose every day. 16 g 0    Vitamin D 12.5 MCG/0.25ML Liquid Take  by mouth.       No current facility-administered medications for this visit.     She  has a past medical history of Allergy, Elevated ALT measurement (4/26/2018), Gastroesophageal reflux disease without esophagitis (3/29/2018), Prediabetes (4/26/2018), and Vitamin D deficiency (3/29/2018).    ROS   No chest pain, no shortness of breath, no abdominal pain  Positive ROS as per HPI.  All other systems reviewed and are negative.     Objective:     /62 (Patient Position: Sitting)   Pulse (!) 59   Temp 36 °C (96.8 °F) (Temporal)   Resp 16   Ht 1.549 m (5' 1\")   Wt 75.3 kg (166 lb)   SpO2 98%  Body mass index is 31.37 kg/m².     Physical Exam:  Constitutional: Alert, no distress.  Skin: Warm, dry, good turgor, no rashes in visible areas.  Eye: Equal, round and reactive, conjunctiva clear, lids normal.  ENMT: Lips without lesions, good dentition  Respiratory: Unlabored respiratory effort  Psych: Alert and oriented x3, normal affect and mood.        Assessment and Plan:   The following " treatment plan was discussed    1. Type 2 diabetes mellitus with hyperglycemia, without long-term current use of insulin (HCC)  Unstable, A1c unchanged  Discuss starting medication versus continuing with dietary and lifestyle changes.  Patient would like to continue working on diet and lifestyle changes, if A1c is not improved in 3 months again she will be agreeable to starting medication.  Retinal exam done today  - MICROALBUMIN CREAT RATIO URINE; Future  - Pneumococcal Conjugate Vaccine 20-Valent (19 yrs+)  - POCT Retinal Eye Exam    2. Mixed hyperlipidemia  Unstable  Diet, exercise, weight loss discussed    3. Need for vaccination  - Pneumococcal Conjugate Vaccine 20-Valent (19 yrs+)    4. Obesity (BMI 30-39.9)  - Patient identified as having weight management issue.  Appropriate orders and counseling given.      Followup: Return in about 3 months (around 8/5/2023) for Diabetes.    The MA is performing the above orders under the direction of Dr. Radford    Please note that this dictation was created using voice recognition software. I have made every reasonable attempt to correct obvious errors, but I expect that there are errors of grammar and possibly content that I did not discover before finalizing the note.

## 2023-05-05 NOTE — ASSESSMENT & PLAN NOTE
A1C 6.8, unchanged from last visit, has been prediabetic for years. Mother, father, and brother are diabetic.     Has been lowering carbs and sugar. Eating at work ([employer]) most days.

## 2023-05-06 LAB
CREAT UR-MCNC: 190.74 MG/DL
MICROALBUMIN UR-MCNC: <1.2 MG/DL
MICROALBUMIN/CREAT UR: NORMAL MG/G (ref 0–30)

## 2023-10-30 ENCOUNTER — OFFICE VISIT (OUTPATIENT)
Dept: MEDICAL GROUP | Facility: MEDICAL CENTER | Age: 43
End: 2023-10-30
Payer: COMMERCIAL

## 2023-10-30 VITALS
HEART RATE: 73 BPM | HEIGHT: 62 IN | DIASTOLIC BLOOD PRESSURE: 60 MMHG | SYSTOLIC BLOOD PRESSURE: 110 MMHG | WEIGHT: 165 LBS | TEMPERATURE: 97.1 F | OXYGEN SATURATION: 96 % | BODY MASS INDEX: 30.36 KG/M2

## 2023-10-30 DIAGNOSIS — E11.65 TYPE 2 DIABETES MELLITUS WITH HYPERGLYCEMIA, WITHOUT LONG-TERM CURRENT USE OF INSULIN (HCC): ICD-10-CM

## 2023-10-30 DIAGNOSIS — J98.8 RTI (RESPIRATORY TRACT INFECTION): ICD-10-CM

## 2023-10-30 DIAGNOSIS — R74.01 ELEVATED ALT MEASUREMENT: ICD-10-CM

## 2023-10-30 PROBLEM — R05.1 ACUTE COUGH: Status: ACTIVE | Noted: 2023-10-30

## 2023-10-30 PROCEDURE — 3078F DIAST BP <80 MM HG: CPT | Performed by: NURSE PRACTITIONER

## 2023-10-30 PROCEDURE — 3074F SYST BP LT 130 MM HG: CPT | Performed by: NURSE PRACTITIONER

## 2023-10-30 PROCEDURE — 99214 OFFICE O/P EST MOD 30 MIN: CPT | Performed by: NURSE PRACTITIONER

## 2023-10-30 RX ORDER — BENZONATATE 100 MG/1
100 CAPSULE ORAL 3 TIMES DAILY PRN
Qty: 60 CAPSULE | Refills: 0 | Status: SHIPPED | OUTPATIENT
Start: 2023-10-30

## 2023-10-30 RX ORDER — AZITHROMYCIN 250 MG/1
TABLET, FILM COATED ORAL
Qty: 6 TABLET | Refills: 0 | Status: SHIPPED | OUTPATIENT
Start: 2023-10-30 | End: 2023-11-04

## 2023-10-30 RX ORDER — AMOXICILLIN AND CLAVULANATE POTASSIUM 875; 125 MG/1; MG/1
1 TABLET, FILM COATED ORAL 2 TIMES DAILY
Qty: 10 TABLET | Refills: 0 | Status: SHIPPED | OUTPATIENT
Start: 2023-10-30 | End: 2023-11-04

## 2023-10-30 ASSESSMENT — FIBROSIS 4 INDEX: FIB4 SCORE: 0.64

## 2023-10-30 NOTE — ASSESSMENT & PLAN NOTE
Symptoms started about 7-8 days ago with cough, loss of voice, sinus congestion and drainage    Denies fevers, chills, body aches, shortness of breath, chest pain, ear pain, sore throat.     Taking OTC tylenol    Today she feels like symptoms are the same, not improving.

## 2023-11-06 ENCOUNTER — APPOINTMENT (OUTPATIENT)
Dept: RADIOLOGY | Facility: MEDICAL CENTER | Age: 43
End: 2023-11-06
Attending: NURSE PRACTITIONER
Payer: COMMERCIAL

## 2023-11-06 DIAGNOSIS — Z12.31 SCREENING MAMMOGRAM, ENCOUNTER FOR: ICD-10-CM

## 2023-11-06 PROCEDURE — 77063 BREAST TOMOSYNTHESIS BI: CPT

## 2023-11-07 NOTE — PROGRESS NOTES
"Subjective:   Dorothy Dotson is a 43 y.o. female here today for cough    Acute cough  Symptoms started about 7-8 days ago with cough, loss of voice, sinus congestion and drainage    Denies fevers, chills, body aches, shortness of breath, chest pain, ear pain, sore throat.     Taking OTC tylenol    Today she feels like symptoms are the same, not improving.        Current medicines (including changes today)  Current Outpatient Medications   Medication Sig Dispense Refill    benzonatate (TESSALON) 100 MG Cap Take 1 Capsule by mouth 3 times a day as needed for Cough. 60 Capsule 0    Vitamin D 12.5 MCG/0.25ML Liquid Take  by mouth.      vitamin D2, Ergocalciferol, (DRISDOL) 1.25 MG (80134 UT) Cap capsule TAKE 1 CAPSULE BY MOUTH EVERY 7 DAYS (Patient not taking: Reported on 10/30/2023) 12 Capsule 0    fluticasone (FLONASE) 50 MCG/ACT nasal spray Spray 1 Spray in nose every day. 16 g 0     No current facility-administered medications for this visit.     She  has a past medical history of Allergy, Elevated ALT measurement (4/26/2018), Gastroesophageal reflux disease without esophagitis (3/29/2018), Prediabetes (4/26/2018), and Vitamin D deficiency (3/29/2018).    ROS   No chest pain, no shortness of breath, no abdominal pain  Positive ROS as per HPI.  All other systems reviewed and are negative.     Objective:     /60   Pulse 73   Temp 36.2 °C (97.1 °F) (Temporal)   Ht 1.58 m (5' 2.21\")   Wt 74.8 kg (165 lb)   SpO2 96%  Body mass index is 29.98 kg/m².     Physical Exam:  Constitutional: Alert, no distress.  Skin: Warm, dry, good turgor, no rashes in visible areas.  Eye: Equal, round and reactive, conjunctiva clear, lids normal.  ENMT: Lips without lesions, good dentition, oropharynx clear.  Neck: Trachea midline, no masses, no thyromegaly. No cervical or supraclavicular lymphadenopathy  Respiratory: Unlabored respiratory effort, RUL, RLM rhonchi with decreased breath sounds  Cardiovascular: Normal S1, S2, " no murmur, no edema.  Psych: Alert and oriented x3, normal affect and mood.        Assessment and Plan:   The following treatment plan was discussed    1. RTI (respiratory tract infection)  Unstable  Right lung sounds concerning for CAP, patient at increased risk with underlying diabetes  Will treat with augmentin and Doxy x 5 days, tessalon as needed  - benzonatate (TESSALON) 100 MG Cap; Take 1 Capsule by mouth 3 times a day as needed for Cough.  Dispense: 60 Capsule; Refill: 0  - amoxicillin-clavulanate (AUGMENTIN) 875-125 MG Tab; Take 1 Tablet by mouth 2 times a day for 5 days.  Dispense: 10 Tablet; Refill: 0  - azithromycin (ZITHROMAX) 250 MG Tab; 2 tabs by mouth day 1, 1 tab by mouth days 2-5  Dispense: 6 Tablet; Refill: 0    2. Type 2 diabetes mellitus with hyperglycemia, without long-term current use of insulin (HCC)  - HEMOGLOBIN A1C; Future    3. Elevated ALT measurement  - HEPATIC FUNCTION PANEL; Future      Followup: Return if symptoms worsen or fail to improve.    The MA is performing the above orders under the direction of Dr. Radford    Please note that this dictation was created using voice recognition software. I have made every reasonable attempt to correct obvious errors, but I expect that there are errors of grammar and possibly content that I did not discover before finalizing the note.

## 2023-12-14 ENCOUNTER — APPOINTMENT (OUTPATIENT)
Dept: MEDICAL GROUP | Facility: MEDICAL CENTER | Age: 43
End: 2023-12-14
Payer: COMMERCIAL

## 2023-12-21 ENCOUNTER — APPOINTMENT (OUTPATIENT)
Dept: MEDICAL GROUP | Facility: MEDICAL CENTER | Age: 43
End: 2023-12-21
Payer: COMMERCIAL

## 2023-12-22 ENCOUNTER — APPOINTMENT (OUTPATIENT)
Dept: MEDICAL GROUP | Facility: MEDICAL CENTER | Age: 43
End: 2023-12-22
Payer: COMMERCIAL

## 2024-01-10 LAB
ALBUMIN SERPL-MCNC: 4.5 G/DL (ref 3.9–4.9)
ALP SERPL-CCNC: 82 IU/L (ref 44–121)
ALT SERPL-CCNC: 24 IU/L (ref 0–32)
AST SERPL-CCNC: 16 IU/L (ref 0–40)
BILIRUB DIRECT SERPL-MCNC: 0.1 MG/DL (ref 0–0.4)
BILIRUB SERPL-MCNC: 0.4 MG/DL (ref 0–1.2)
HBA1C MFR BLD: 7.7 % (ref 4.8–5.6)
PROT SERPL-MCNC: 6.9 G/DL (ref 6–8.5)

## 2024-01-11 ENCOUNTER — APPOINTMENT (OUTPATIENT)
Dept: MEDICAL GROUP | Facility: MEDICAL CENTER | Age: 44
End: 2024-01-11
Payer: COMMERCIAL

## 2024-01-12 ENCOUNTER — APPOINTMENT (OUTPATIENT)
Dept: MEDICAL GROUP | Facility: MEDICAL CENTER | Age: 44
End: 2024-01-12
Payer: COMMERCIAL

## 2024-01-15 ENCOUNTER — OFFICE VISIT (OUTPATIENT)
Dept: MEDICAL GROUP | Facility: MEDICAL CENTER | Age: 44
End: 2024-01-15
Payer: COMMERCIAL

## 2024-01-15 VITALS
OXYGEN SATURATION: 97 % | HEART RATE: 78 BPM | DIASTOLIC BLOOD PRESSURE: 70 MMHG | RESPIRATION RATE: 16 BRPM | TEMPERATURE: 97.3 F | HEIGHT: 62 IN | WEIGHT: 158 LBS | SYSTOLIC BLOOD PRESSURE: 106 MMHG | BODY MASS INDEX: 29.08 KG/M2

## 2024-01-15 DIAGNOSIS — R92.30 DENSE BREAST TISSUE: ICD-10-CM

## 2024-01-15 DIAGNOSIS — E11.65 TYPE 2 DIABETES MELLITUS WITH HYPERGLYCEMIA, WITHOUT LONG-TERM CURRENT USE OF INSULIN (HCC): ICD-10-CM

## 2024-01-15 DIAGNOSIS — Z23 NEED FOR VACCINATION: ICD-10-CM

## 2024-01-15 DIAGNOSIS — M77.02 EPICONDYLITIS ELBOW, MEDIAL, LEFT: ICD-10-CM

## 2024-01-15 PROCEDURE — 3078F DIAST BP <80 MM HG: CPT | Performed by: NURSE PRACTITIONER

## 2024-01-15 PROCEDURE — 3074F SYST BP LT 130 MM HG: CPT | Performed by: NURSE PRACTITIONER

## 2024-01-15 PROCEDURE — 99214 OFFICE O/P EST MOD 30 MIN: CPT | Mod: 25 | Performed by: NURSE PRACTITIONER

## 2024-01-15 PROCEDURE — 90471 IMMUNIZATION ADMIN: CPT | Performed by: NURSE PRACTITIONER

## 2024-01-15 PROCEDURE — 90686 IIV4 VACC NO PRSV 0.5 ML IM: CPT | Performed by: NURSE PRACTITIONER

## 2024-01-15 RX ORDER — DICLOFENAC SODIUM 75 MG/1
75 TABLET, DELAYED RELEASE ORAL 2 TIMES DAILY
Qty: 28 TABLET | Refills: 0 | Status: SHIPPED | OUTPATIENT
Start: 2024-01-15

## 2024-01-15 RX ORDER — HYDROCODONE BITARTRATE AND ACETAMINOPHEN 5; 325 MG/1; MG/1
TABLET ORAL
COMMUNITY
Start: 2023-11-10

## 2024-01-15 ASSESSMENT — FIBROSIS 4 INDEX: FIB4 SCORE: 0.46

## 2024-01-15 ASSESSMENT — PATIENT HEALTH QUESTIONNAIRE - PHQ9: CLINICAL INTERPRETATION OF PHQ2 SCORE: 0

## 2024-01-15 NOTE — PROGRESS NOTES
Subjective:   Dorothy Dotson is a 43 y.o. female here today for diabetes follow up    Type 2 diabetes mellitus with hyperglycemia, without long-term current use of insulin ([facility])  Chronic, A1C worse from 6.8 to 7.7, unchanged from last visit. Mother, father, and brother are diabetic.     Has been lowering carbs and sugar. Eating at work ([employer]) most days, more salads, cut out a lot of bread.     Breakfast: egg, avocado, tomato, occ 1/2 piece bread. Coffee with sweetened creamer.     Lunch: salad, half sandwich,     Dinner: More snacking, string cheese, tuna, piece of fruit    No other sweets, no soda or juice. Tea with honey     Current medicines (including changes today)  Current Outpatient Medications   Medication Sig Dispense Refill    HYDROcodone-acetaminophen (NORCO) 5-325 MG Tab per tablet       triazolam (HALCION) 0.25 MG Tab       diclofenac DR (VOLTAREN) 75 MG Tablet Delayed Response Take 1 Tablet by mouth 2 times a day. 28 Tablet 0    Semaglutide,0.25 or 0.5MG/DOS, 2 MG/3ML Solution Pen-injector Inject 0.25 mg under the skin every 7 days. 3 mL 11    benzonatate (TESSALON) 100 MG Cap Take 1 Capsule by mouth 3 times a day as needed for Cough. 60 Capsule 0    vitamin D2, Ergocalciferol, (DRISDOL) 1.25 MG (52220 UT) Cap capsule TAKE 1 CAPSULE BY MOUTH EVERY 7 DAYS (Patient not taking: Reported on 10/30/2023) 12 Capsule 0    fluticasone (FLONASE) 50 MCG/ACT nasal spray Spray 1 Spray in nose every day. 16 g 0    Vitamin D 12.5 MCG/0.25ML Liquid Take  by mouth.       No current facility-administered medications for this visit.     She  has a past medical history of Allergy, Elevated ALT measurement (4/26/2018), Gastroesophageal reflux disease without esophagitis (3/29/2018), Prediabetes (4/26/2018), and Vitamin D deficiency (3/29/2018).    ROS   No chest pain, no shortness of breath, no abdominal pain  Positive ROS as per HPI.  All other systems reviewed and are negative.     Objective:     /70    "Pulse 78   Temp 36.3 °C (97.3 °F) (Temporal)   Resp 16   Ht 1.58 m (5' 2.21\")   Wt 71.7 kg (158 lb)   SpO2 97%  Body mass index is 28.71 kg/m².     Physical Exam:  Constitutional: Alert, no distress.  Skin: Warm, dry, good turgor, no rashes in visible areas.  Eye: Equal, round and reactive, conjunctiva clear, lids normal.  ENMT: Lips without lesions, good dentition  Respiratory: Unlabored respiratory effort  Psych: Alert and oriented x3, normal affect and mood.  MSK: tenderness to palpation of lateral and medial epicondyles. No edema, erythema, or decreased ROM      Assessment and Plan:   The following treatment plan was discussed    1. Type 2 diabetes mellitus with hyperglycemia, without long-term current use of insulin (HCC)  Unstable, worse  Discussed starting medication today, she is having difficulty with weight loss  Start ozempic 0.25 mg weekly, admin instructions and info packet given/discussed with patient   Continue with diet, exercise, weight loss  - Semaglutide,0.25 or 0.5MG/DOS, 2 MG/3ML Solution Pen-injector; Inject 0.25 mg under the skin every 7 days.  Dispense: 3 mL; Refill: 11    2. Epicondylitis elbow, medial, left  Unstable  Exercises given to patient  Diclofenac BID for 1-2 weeks  Ice, compression sleeve, rest advised  - diclofenac DR (VOLTAREN) 75 MG Tablet Delayed Response; Take 1 Tablet by mouth 2 times a day.  Dispense: 28 Tablet; Refill: 0    3. Dense breast tissue  - US-SCREENING WHOLE BREAST BILATERAL (3D SCREENING); Future    4. Need for vaccination  - Influenza Vaccine Quad Injection (PF)      Followup: Return in about 4 weeks (around 2/12/2024) for Diabetes.    The MA is performing the above orders under the direction of Dr. Radford    Please note that this dictation was created using voice recognition software. I have made every reasonable attempt to correct obvious errors, but I expect that there are errors of grammar and possibly content that I did not discover before " finalizing the note.

## 2024-01-15 NOTE — ASSESSMENT & PLAN NOTE
Chronic, A1C worse from 6.8 to 7.7, unchanged from last visit. Mother, father, and brother are diabetic.     Has been lowering carbs and sugar. Eating at work ([employer]) most days, more salads, cut out a lot of bread.     Breakfast: egg, avocado, tomato, occ 1/2 piece bread. Coffee with sweetened creamer.     Lunch: salad, half sandwich,     Dinner: More snacking, string cheese, tuna, piece of fruit    No other sweets, no soda or juice. Tea with honey

## 2024-02-14 ENCOUNTER — APPOINTMENT (OUTPATIENT)
Dept: MEDICAL GROUP | Facility: MEDICAL CENTER | Age: 44
End: 2024-02-14
Payer: COMMERCIAL

## 2024-02-16 ENCOUNTER — TELEPHONE (OUTPATIENT)
Dept: MEDICAL GROUP | Facility: MEDICAL CENTER | Age: 44
End: 2024-02-16

## 2024-02-16 NOTE — TELEPHONE ENCOUNTER
DOCUMENTATION OF PAR STATUS:    1. Name of Medication & Dose: OZEMPIC    2. Name of Prescription Coverage Company & phone #: [employer] MY [employer] KEY [id number]    3. Date Prior Auth Submitted: 02/12/24    4. What information was given to obtain insurance decision?     5. Prior Auth Status? Pending    6. Patient Notified: YES     PT INFORMED INSURANCE CARDS NEEDED ELIGIBILITY COULD NOT BE FOUND

## 2024-02-22 ENCOUNTER — TELEPHONE (OUTPATIENT)
Dept: MEDICAL GROUP | Facility: MEDICAL CENTER | Age: 44
End: 2024-02-22
Payer: COMMERCIAL

## 2024-02-28 ENCOUNTER — TELEPHONE (OUTPATIENT)
Dept: MEDICAL GROUP | Facility: MEDICAL CENTER | Age: 44
End: 2024-02-28
Payer: COMMERCIAL

## 2024-04-19 ENCOUNTER — HOSPITAL ENCOUNTER (OUTPATIENT)
Dept: RADIOLOGY | Facility: MEDICAL CENTER | Age: 44
End: 2024-04-19
Attending: NURSE PRACTITIONER
Payer: COMMERCIAL

## 2024-04-19 DIAGNOSIS — R92.30 DENSE BREAST TISSUE: ICD-10-CM

## 2024-04-19 PROCEDURE — 76641 ULTRASOUND BREAST COMPLETE: CPT

## 2024-12-06 ENCOUNTER — OFFICE VISIT (OUTPATIENT)
Dept: MEDICAL GROUP | Facility: MEDICAL CENTER | Age: 44
End: 2024-12-06
Payer: COMMERCIAL

## 2024-12-06 ENCOUNTER — HOSPITAL ENCOUNTER (OUTPATIENT)
Facility: MEDICAL CENTER | Age: 44
End: 2024-12-06
Attending: NURSE PRACTITIONER
Payer: COMMERCIAL

## 2024-12-06 VITALS
WEIGHT: 163.4 LBS | HEART RATE: 70 BPM | HEIGHT: 61 IN | TEMPERATURE: 96.8 F | DIASTOLIC BLOOD PRESSURE: 78 MMHG | SYSTOLIC BLOOD PRESSURE: 100 MMHG | BODY MASS INDEX: 30.85 KG/M2 | OXYGEN SATURATION: 97 %

## 2024-12-06 DIAGNOSIS — E66.9 OBESITY (BMI 30-39.9): ICD-10-CM

## 2024-12-06 DIAGNOSIS — E55.9 VITAMIN D DEFICIENCY: ICD-10-CM

## 2024-12-06 DIAGNOSIS — E11.65 TYPE 2 DIABETES MELLITUS WITH HYPERGLYCEMIA, WITHOUT LONG-TERM CURRENT USE OF INSULIN (HCC): ICD-10-CM

## 2024-12-06 DIAGNOSIS — Z11.4 ENCOUNTER FOR SCREENING FOR HIV: ICD-10-CM

## 2024-12-06 DIAGNOSIS — Z23 NEED FOR VACCINATION: ICD-10-CM

## 2024-12-06 DIAGNOSIS — Z00.00 ANNUAL PHYSICAL EXAM: ICD-10-CM

## 2024-12-06 DIAGNOSIS — E78.2 MIXED HYPERLIPIDEMIA: ICD-10-CM

## 2024-12-06 LAB
HBA1C MFR BLD: 7 % (ref ?–5.8)
POCT INT CON NEG: NEGATIVE
POCT INT CON POS: POSITIVE

## 2024-12-06 PROCEDURE — 3074F SYST BP LT 130 MM HG: CPT | Performed by: NURSE PRACTITIONER

## 2024-12-06 PROCEDURE — 82043 UR ALBUMIN QUANTITATIVE: CPT

## 2024-12-06 PROCEDURE — 83036 HEMOGLOBIN GLYCOSYLATED A1C: CPT | Performed by: NURSE PRACTITIONER

## 2024-12-06 PROCEDURE — 3078F DIAST BP <80 MM HG: CPT | Performed by: NURSE PRACTITIONER

## 2024-12-06 PROCEDURE — 82570 ASSAY OF URINE CREATININE: CPT

## 2024-12-06 PROCEDURE — 99214 OFFICE O/P EST MOD 30 MIN: CPT | Mod: 25 | Performed by: NURSE PRACTITIONER

## 2024-12-06 ASSESSMENT — FIBROSIS 4 INDEX: FIB4 SCORE: 0.47

## 2024-12-06 NOTE — LETTER
KATTY Kolb.  [facility]   [address] [facility] [place] [id number]  Phone: [phone] - Fax: [phone]        December 6, 2024         Patient: Dorothy Dotson   YOB: 1980   Date of Visit: 12/6/2024           To Whom it May Concern:    Dorothy Dotson was seen in my clinic on 12/6/2024.  Her A1C is currently 7.0. If you have any questions or concerns, please don't hesitate to call.        Sincerely,           LYNDSAY Kolb  Electronically Signed

## 2024-12-08 NOTE — PROGRESS NOTES
"Subjective:     History of Present Illness  The patient presents for a follow-up on diabetes and the use of Ozempic.    She is currently on a regimen of Ozempic 0.25 mg, which she reports is effective. She has experienced some weight loss, with her weight decreasing from 169 pounds to 159 pounds. She no longer has sugar cravings, her appetite has decreased, and she is satisfied with one meal or smaller portions. Initially, she experienced constipation, diarrhea, and nausea when starting Ozempic, but these symptoms have since subsided. She has not been able to monitor her weight at home due to a broken scale.     A1c test, retinal exam, and urine microalbumin are due today. She is planning to get dental procedure done, the surgeon requires her A1c level to be below 8 before proceeding with any dental surgery. She has a dental appointment scheduled due to a lost implant.      Current medicines (including changes today)  Current Outpatient Medications   Medication Sig Dispense Refill    Semaglutide,0.25 or 0.5MG/DOS, 2 MG/3ML Solution Pen-injector Inject 0.5 mg under the skin every 7 days. 3 mL 11     No current facility-administered medications for this visit.     She  has a past medical history of Allergy, Elevated ALT measurement (4/26/2018), Gastroesophageal reflux disease without esophagitis (3/29/2018), Prediabetes (4/26/2018), and Vitamin D deficiency (3/29/2018).    ROS   No chest pain, no shortness of breath, no abdominal pain  Positive ROS as per HPI.  All other systems reviewed and are negative.     Objective:     /78   Pulse 70   Temp 36 °C (96.8 °F) (Temporal)   Ht 1.549 m (5' 1\")   Wt 74.1 kg (163 lb 6.4 oz)   SpO2 97%  Body mass index is 30.87 kg/m².   Physical Exam    Constitutional: Alert, no distress.  Skin: Warm, dry, good turgor, no rashes in visible areas.  Eye: Equal, round and reactive, conjunctiva clear, lids normal.  ENMT: Lips without lesions, good dentition  Respiratory: Unlabored " respiratory effort  Psych: Alert and oriented x3, normal affect and mood.      Results  Laboratory Studies  A1c was 7.7, now it is 7.0.        Assessment and Plan:   The following treatment plan was discussed    Assessment & Plan  1. Diabetes Mellitus.  Her A1c has decreased from 7.7 to 7.0. The dosage of Ozempic will be increased from 0.25 mg to 0.5 mg. She is advised to continue using the same pen, adjusting the dose as needed. A new prescription will be sent to [facility] on [address]. Annual labs will be ordered.  Retinal done today    2. Health Maintenance.  An influenza vaccine will be administered today.    Follow-up  Return in 4-6 weeks for follow up.      ORDERS:  1. Type 2 diabetes mellitus with hyperglycemia, without long-term current use of insulin ([facility])  - POCT Hemoglobin A1C  - POCT Retinal Eye Exam  - MICROALBUMIN CREAT RATIO URINE; Future  - Semaglutide,0.25 or 0.5MG/DOS, 2 MG/3ML Solution Pen-injector; Inject 0.5 mg under the skin every 7 days.  Dispense: 3 mL; Refill: 11  - Comp Metabolic Panel; Future  - TSH WITH REFLEX TO FT4; Future  - Lipid Profile; Future    2. Obesity (BMI 30-39.9)    3. Need for vaccination  - INFLUENZA VACCINE TRI INJ (PF)    4. Annual physical exam  - CBC WITH DIFFERENTIAL; Future  - Comp Metabolic Panel; Future  - VITAMIN D,25 HYDROXY (DEFICIENCY); Future  - TSH WITH REFLEX TO FT4; Future  - Lipid Profile; Future    5. Mixed hyperlipidemia  - Lipid Profile; Future    6. Vitamin D deficiency  - VITAMIN D,25 HYDROXY (DEFICIENCY); Future    7. Encounter for screening for HIV  - HIV AG/AB COMBO ASSAY SCREENING; Future          The MA is performing the above orders under the direction of Dr. Radford    Please note that this dictation was created using voice recognition software. I have made every reasonable attempt to correct obvious errors, but I expect that there are errors of grammar and possibly content that I did not discover before finalizing the note.       Attestation      Verbal consent was acquired by the patient to use [employer] ambient listening note generation during this visit Yes

## 2024-12-09 LAB
CREAT UR-MCNC: 78.35 MG/DL
MICROALBUMIN UR-MCNC: 2.9 MG/DL
MICROALBUMIN/CREAT UR: 37 MG/G (ref 0–30)

## 2024-12-16 PROCEDURE — 90656 IIV3 VACC NO PRSV 0.5 ML IM: CPT

## 2024-12-16 PROCEDURE — 90471 IMMUNIZATION ADMIN: CPT

## 2025-02-18 ENCOUNTER — OFFICE VISIT (OUTPATIENT)
Dept: URGENT CARE | Facility: PHYSICIAN GROUP | Age: 45
End: 2025-02-18
Payer: COMMERCIAL

## 2025-02-18 VITALS
RESPIRATION RATE: 16 BRPM | WEIGHT: 169 LBS | HEIGHT: 61 IN | TEMPERATURE: 98.8 F | BODY MASS INDEX: 31.91 KG/M2 | OXYGEN SATURATION: 97 % | DIASTOLIC BLOOD PRESSURE: 78 MMHG | SYSTOLIC BLOOD PRESSURE: 118 MMHG | HEART RATE: 88 BPM

## 2025-02-18 DIAGNOSIS — B96.89 ACUTE BACTERIAL SINUSITIS: ICD-10-CM

## 2025-02-18 DIAGNOSIS — J01.90 ACUTE BACTERIAL SINUSITIS: ICD-10-CM

## 2025-02-18 PROCEDURE — 3074F SYST BP LT 130 MM HG: CPT | Performed by: PHYSICIAN ASSISTANT

## 2025-02-18 PROCEDURE — 3078F DIAST BP <80 MM HG: CPT | Performed by: PHYSICIAN ASSISTANT

## 2025-02-18 PROCEDURE — 99213 OFFICE O/P EST LOW 20 MIN: CPT | Performed by: PHYSICIAN ASSISTANT

## 2025-02-18 ASSESSMENT — ENCOUNTER SYMPTOMS
HEADACHES: 0
SPUTUM PRODUCTION: 0
SHORTNESS OF BREATH: 0
PALPITATIONS: 0
SINUS PAIN: 1
DIZZINESS: 0
FEVER: 0
CHILLS: 0
DIARRHEA: 0
MYALGIAS: 0
WHEEZING: 0
NAUSEA: 0
COUGH: 1
SORE THROAT: 1
VOMITING: 0
DIAPHORESIS: 0
ABDOMINAL PAIN: 0

## 2025-02-18 NOTE — PROGRESS NOTES
Subjective:     CHIEF COMPLAINT  Chief Complaint   Patient presents with    Cold Symptoms     Eye discharge, sore throat, cough , L ear pain, x2 weeks        HPI  Dorothy Dotson is a very pleasant 44 y.o. female who presents to clinic with URI-like symptoms x 2 weeks.  Main complaint at this time is left-sided facial pain and pressure.  Experiencing pressure predominantly over the left maxillary sinus.  She also describes intermittent left ear pain that it is worsened with swallowing.  Mild pain when swallowing without any difficulty.  Over the last 2 mornings has noted some mild discharge in her left eye.  No recent fevers.  No body aches or chills.  Has an occasional lingering dry cough.  Has tried various over-the-counter cold and sinus medications without any significant improvement.    REVIEW OF SYSTEMS  Review of Systems   Constitutional:  Negative for chills, diaphoresis, fever and malaise/fatigue.   HENT:  Positive for congestion, ear pain, sinus pain and sore throat.    Respiratory:  Positive for cough. Negative for sputum production, shortness of breath and wheezing.    Cardiovascular:  Negative for chest pain and palpitations.   Gastrointestinal:  Negative for abdominal pain, diarrhea, nausea and vomiting.   Musculoskeletal:  Negative for myalgias.   Neurological:  Negative for dizziness and headaches.       PAST MEDICAL HISTORY  Patient Active Problem List    Diagnosis Date Noted    Acute cough 10/30/2023    Mixed hyperlipidemia 01/13/2023    Bronchitis 03/06/2019    Encounter for initial prescription of contraceptive pills 07/26/2018    Type 2 diabetes mellitus with hyperglycemia, without long-term current use of insulin (HCC) 04/26/2018    Elevated ALT measurement 04/26/2018    Obesity (BMI 30-39.9) 04/26/2018    Annual physical exam 04/26/2018    Environmental allergies 03/29/2018    Family history of diabetes mellitus (DM) 03/29/2018    Health care maintenance 03/29/2018    Gastroesophageal  "reflux disease without esophagitis 03/29/2018    Vitamin D deficiency 03/29/2018       SURGICAL HISTORY  patient denies any surgical history    ALLERGIES  No Known Allergies    CURRENT MEDICATIONS  Home Medications       Reviewed by Josep Chawla P.A.-C. (Physician Assistant) on 02/18/25 at 0907  Med List Status: <None>     Medication Last Dose Status   Semaglutide,0.25 or 0.5MG/DOS, 2 MG/3ML Solution Pen-injector Taking Active                    SOCIAL HISTORY  Social History     Tobacco Use    Smoking status: Never    Smokeless tobacco: Never   Vaping Use    Vaping status: Never Used   Substance and Sexual Activity    Alcohol use: No    Drug use: No    Sexual activity: Not Currently       FAMILY HISTORY  Family History   Problem Relation Age of Onset    Diabetes Mother     Diabetes Father           Objective:     VITAL SIGNS: /78 (BP Location: Right arm, Patient Position: Sitting, BP Cuff Size: Adult)   Pulse 88   Temp 37.1 °C (98.8 °F) (Temporal)   Resp 16   Ht 1.549 m (5' 1\")   Wt 76.7 kg (169 lb)   SpO2 97%   BMI 31.93 kg/m²     PHYSICAL EXAM  Physical Exam  Constitutional:       General: She is not in acute distress.     Appearance: Normal appearance. She is not ill-appearing, toxic-appearing or diaphoretic.   HENT:      Head: Normocephalic and atraumatic.      Comments: Tenderness to light percussion over the left maxillary sinus     Right Ear: Tympanic membrane, ear canal and external ear normal.      Left Ear: Ear canal and external ear normal.      Ears:      Comments: Left-sided serous middle ear effusion     Nose: Congestion and rhinorrhea present.      Mouth/Throat:      Mouth: Mucous membranes are moist.      Pharynx: No oropharyngeal exudate or posterior oropharyngeal erythema.   Eyes:      General:         Right eye: No discharge.         Left eye: No discharge.      Conjunctiva/sclera: Conjunctivae normal.   Cardiovascular:      Rate and Rhythm: Normal rate and regular rhythm.      " Pulses: Normal pulses.      Heart sounds: Normal heart sounds.   Pulmonary:      Effort: Pulmonary effort is normal.      Breath sounds: Normal breath sounds. No wheezing, rhonchi or rales.   Musculoskeletal:      Cervical back: Normal range of motion. No muscular tenderness.   Lymphadenopathy:      Cervical: No cervical adenopathy.   Skin:     General: Skin is warm and dry.      Capillary Refill: Capillary refill takes less than 2 seconds.   Neurological:      Mental Status: She is alert.   Psychiatric:         Mood and Affect: Mood normal.         Thought Content: Thought content normal.         Assessment/Plan:     1. Acute bacterial sinusitis  - amoxicillin-clavulanate (AUGMENTIN) 875-125 MG Tab; Take 1 Tablet by mouth 2 times a day for 7 days.  Dispense: 14 Tablet; Refill: 0      MDM/Comments:    -Nasal spray and allergy medications as directed (Zyrtec or Loratadine).  -You may try saline irrigation or neti pot.   -Drink plenty of fluids.  -Ibuprofen or Tylenol as directed for pain.   -Warm compress to sinuses.      Follow up with primary care provider. Urgently for worsening symptoms, persistent fevers, facial swelling, visual changes, weakness, elevated heart rate, stiff neck, symptoms last longer than 10 days, or any other concerns.    Differential diagnosis, natural history, supportive care, and indications for immediate follow-up discussed. All questions answered. Patient agrees with the plan of care.    Follow-up as needed if symptoms worsen or fail to improve to PCP, Urgent care or Emergency Room.    I have personally reviewed prior external notes and test results pertinent to today's visit.  I have independently reviewed and interpreted all diagnostics ordered during this urgent care acute visit.   Discussed management options (risks,benefits, and alternatives to treatment). Pt expresses understanding and the treatment plan was agreed upon. Questions were encouraged and answered to pt's  satisfaction.    Please note that this dictation was created using voice recognition software. I have made a reasonable attempt to correct obvious errors, but I expect that there are errors of grammar and possibly content that I did not discover before finalizing the note.

## 2025-03-11 ENCOUNTER — TELEPHONE (OUTPATIENT)
Dept: MEDICAL GROUP | Facility: IMAGING CENTER | Age: 45
End: 2025-03-11
Payer: COMMERCIAL

## 2025-03-11 NOTE — TELEPHONE ENCOUNTER
LVM to reschedule a patient's appointment on 03/17/2025 with Lena Dawson. Lena aDwson will be out-of-office at that time. Patient was instructed to call [phone] or use [employer] application to reschedule at their earliest convenience.

## 2025-03-17 ENCOUNTER — APPOINTMENT (OUTPATIENT)
Dept: MEDICAL GROUP | Facility: IMAGING CENTER | Age: 45
End: 2025-03-17
Payer: COMMERCIAL

## 2025-04-02 ENCOUNTER — APPOINTMENT (OUTPATIENT)
Dept: MEDICAL GROUP | Facility: IMAGING CENTER | Age: 45
End: 2025-04-02
Payer: COMMERCIAL

## 2025-04-22 ENCOUNTER — APPOINTMENT (OUTPATIENT)
Dept: MEDICAL GROUP | Facility: PHYSICIAN GROUP | Age: 45
End: 2025-04-22
Payer: COMMERCIAL

## 2025-05-27 ENCOUNTER — RESULTS FOLLOW-UP (OUTPATIENT)
Dept: MEDICAL GROUP | Facility: IMAGING CENTER | Age: 45
End: 2025-05-27

## 2025-06-05 ENCOUNTER — APPOINTMENT (OUTPATIENT)
Dept: MEDICAL GROUP | Facility: IMAGING CENTER | Age: 45
End: 2025-06-05
Payer: COMMERCIAL

## 2025-06-10 ENCOUNTER — APPOINTMENT (OUTPATIENT)
Dept: MEDICAL GROUP | Facility: IMAGING CENTER | Age: 45
End: 2025-06-10
Payer: COMMERCIAL

## 2025-08-05 ENCOUNTER — APPOINTMENT (OUTPATIENT)
Dept: MEDICAL GROUP | Facility: IMAGING CENTER | Age: 45
End: 2025-08-05
Payer: COMMERCIAL

## 2025-08-06 ENCOUNTER — APPOINTMENT (OUTPATIENT)
Dept: MEDICAL GROUP | Facility: IMAGING CENTER | Age: 45
End: 2025-08-06
Payer: COMMERCIAL

## 2025-08-07 ENCOUNTER — APPOINTMENT (OUTPATIENT)
Dept: MEDICAL GROUP | Facility: IMAGING CENTER | Age: 45
End: 2025-08-07
Payer: COMMERCIAL

## 2025-08-07 LAB
25(OH)D3+25(OH)D2 SERPL-MCNC: 19.1 NG/ML (ref 30–100)
ALBUMIN SERPL-MCNC: 4.7 G/DL (ref 3.9–4.9)
ALP SERPL-CCNC: 94 IU/L (ref 44–121)
ALT SERPL-CCNC: 48 IU/L (ref 0–32)
AST SERPL-CCNC: 27 IU/L (ref 0–40)
BASOPHILS # BLD AUTO: 0.1 X10E3/UL (ref 0–0.2)
BASOPHILS NFR BLD AUTO: 1 %
BILIRUB SERPL-MCNC: 0.4 MG/DL (ref 0–1.2)
BUN SERPL-MCNC: 12 MG/DL (ref 6–24)
BUN/CREAT SERPL: 17 (ref 9–23)
CALCIUM SERPL-MCNC: 9.5 MG/DL (ref 8.7–10.2)
CHLORIDE SERPL-SCNC: 103 MMOL/L (ref 96–106)
CHOLEST SERPL-MCNC: 234 MG/DL (ref 100–199)
CO2 SERPL-SCNC: 18 MMOL/L (ref 20–29)
CREAT SERPL-MCNC: 0.69 MG/DL (ref 0.57–1)
EGFRCR SERPLBLD CKD-EPI 2021: 110 ML/MIN/1.73
EOSINOPHIL # BLD AUTO: 0.2 X10E3/UL (ref 0–0.4)
EOSINOPHIL NFR BLD AUTO: 2 %
ERYTHROCYTE [DISTWIDTH] IN BLOOD BY AUTOMATED COUNT: 11.9 % (ref 11.7–15.4)
GLOBULIN SER CALC-MCNC: 2.4 G/DL (ref 1.5–4.5)
GLUCOSE SERPL-MCNC: 186 MG/DL (ref 70–99)
HCT VFR BLD AUTO: 41.8 % (ref 34–46.6)
HDLC SERPL-MCNC: 48 MG/DL
HGB BLD-MCNC: 13.9 G/DL (ref 11.1–15.9)
HIV 1+2 AB+HIV1 P24 AG SERPL QL IA: NON REACTIVE
IMM GRANULOCYTES # BLD AUTO: 0.1 X10E3/UL (ref 0–0.1)
IMM GRANULOCYTES NFR BLD AUTO: 1 %
IMMATURE CELLS  115398: NORMAL
LDL CALC COMMENT:: ABNORMAL
LDLC SERPL CALC-MCNC: 152 MG/DL (ref 0–99)
LYMPHOCYTES # BLD AUTO: 2.8 X10E3/UL (ref 0.7–3.1)
LYMPHOCYTES NFR BLD AUTO: 34 %
MCH RBC QN AUTO: 30.8 PG (ref 26.6–33)
MCHC RBC AUTO-ENTMCNC: 33.3 G/DL (ref 31.5–35.7)
MCV RBC AUTO: 93 FL (ref 79–97)
MONOCYTES # BLD AUTO: 0.6 X10E3/UL (ref 0.1–0.9)
MONOCYTES NFR BLD AUTO: 7 %
MORPHOLOGY BLD-IMP: NORMAL
NEUTROPHILS # BLD AUTO: 4.5 X10E3/UL (ref 1.4–7)
NEUTROPHILS NFR BLD AUTO: 55 %
NRBC BLD AUTO-RTO: NORMAL %
PLATELET # BLD AUTO: 324 X10E3/UL (ref 150–450)
POTASSIUM SERPL-SCNC: 4.3 MMOL/L (ref 3.5–5.2)
PROT SERPL-MCNC: 7.1 G/DL (ref 6–8.5)
RBC # BLD AUTO: 4.52 X10E6/UL (ref 3.77–5.28)
SODIUM SERPL-SCNC: 138 MMOL/L (ref 134–144)
TRIGL SERPL-MCNC: 187 MG/DL (ref 0–149)
TSH SERPL DL<=0.005 MIU/L-ACNC: 3.6 UIU/ML (ref 0.45–4.5)
VLDLC SERPL CALC-MCNC: 34 MG/DL (ref 5–40)
WBC # BLD AUTO: 8.1 X10E3/UL (ref 3.4–10.8)

## 2025-08-21 ENCOUNTER — TELEMEDICINE (OUTPATIENT)
Dept: MEDICAL GROUP | Facility: IMAGING CENTER | Age: 45
End: 2025-08-21
Payer: COMMERCIAL

## 2025-08-21 VITALS — HEIGHT: 61 IN | BODY MASS INDEX: 31.93 KG/M2

## 2025-08-21 DIAGNOSIS — E55.9 VITAMIN D DEFICIENCY: ICD-10-CM

## 2025-08-21 DIAGNOSIS — Z12.31 SCREENING MAMMOGRAM, ENCOUNTER FOR: ICD-10-CM

## 2025-08-21 DIAGNOSIS — E78.2 MIXED HYPERLIPIDEMIA: ICD-10-CM

## 2025-08-21 DIAGNOSIS — R80.9 TYPE 2 DIABETES MELLITUS WITH DIABETIC MICROALBUMINURIA, WITHOUT LONG-TERM CURRENT USE OF INSULIN (HCC): Primary | ICD-10-CM

## 2025-08-21 DIAGNOSIS — E11.29 TYPE 2 DIABETES MELLITUS WITH DIABETIC MICROALBUMINURIA, WITHOUT LONG-TERM CURRENT USE OF INSULIN (HCC): Primary | ICD-10-CM

## 2025-08-21 DIAGNOSIS — R74.01 ELEVATED ALT MEASUREMENT: ICD-10-CM

## 2025-08-21 PROCEDURE — 99214 OFFICE O/P EST MOD 30 MIN: CPT | Mod: 95 | Performed by: NURSE PRACTITIONER

## 2025-08-21 RX ORDER — ERGOCALCIFEROL 1.25 MG/1
50000 CAPSULE ORAL
Qty: 12 CAPSULE | Refills: 0 | Status: SHIPPED | OUTPATIENT
Start: 2025-08-21

## 2025-08-21 RX ORDER — ROSUVASTATIN CALCIUM 10 MG/1
10 TABLET, COATED ORAL EVERY EVENING
Qty: 100 TABLET | Refills: 3 | Status: SHIPPED | OUTPATIENT
Start: 2025-08-21 | End: 2026-09-25

## 2025-08-21 ASSESSMENT — LIFESTYLE VARIABLES: HOW MANY STANDARD DRINKS CONTAINING ALCOHOL DO YOU HAVE ON A TYPICAL DAY: PATIENT DOES NOT DRINK
